# Patient Record
Sex: FEMALE | Race: WHITE | NOT HISPANIC OR LATINO | Employment: OTHER | URBAN - NONMETROPOLITAN AREA
[De-identification: names, ages, dates, MRNs, and addresses within clinical notes are randomized per-mention and may not be internally consistent; named-entity substitution may affect disease eponyms.]

---

## 2023-01-01 ENCOUNTER — APPOINTMENT (OUTPATIENT)
Dept: HEMATOLOGY/ONCOLOGY | Facility: CLINIC | Age: 73
End: 2023-01-01
Payer: MEDICAID

## 2023-01-01 ENCOUNTER — TELEPHONE (OUTPATIENT)
Dept: HEMATOLOGY/ONCOLOGY | Facility: HOSPITAL | Age: 73
End: 2023-01-01
Payer: MEDICAID

## 2023-01-01 ENCOUNTER — INFUSION (OUTPATIENT)
Dept: HEMATOLOGY/ONCOLOGY | Facility: CLINIC | Age: 73
End: 2023-01-01
Payer: MEDICAID

## 2023-01-01 ENCOUNTER — TELEPHONE (OUTPATIENT)
Dept: HEMATOLOGY/ONCOLOGY | Facility: CLINIC | Age: 73
End: 2023-01-01
Payer: MEDICAID

## 2023-01-01 ENCOUNTER — SOCIAL WORK (OUTPATIENT)
Dept: HEMATOLOGY/ONCOLOGY | Facility: CLINIC | Age: 73
End: 2023-01-01

## 2023-01-01 ENCOUNTER — OFFICE VISIT (OUTPATIENT)
Dept: HEMATOLOGY/ONCOLOGY | Facility: CLINIC | Age: 73
End: 2023-01-01
Payer: MEDICAID

## 2023-01-01 ENCOUNTER — DOCUMENTATION (OUTPATIENT)
Dept: HEMATOLOGY/ONCOLOGY | Facility: CLINIC | Age: 73
End: 2023-01-01
Payer: MEDICAID

## 2023-01-01 ENCOUNTER — TELEPHONE (OUTPATIENT)
Dept: HEMATOLOGY/ONCOLOGY | Facility: CLINIC | Age: 73
End: 2023-01-01

## 2023-01-01 ENCOUNTER — DOCUMENTATION (OUTPATIENT)
Dept: HEMATOLOGY/ONCOLOGY | Facility: CLINIC | Age: 73
End: 2023-01-01

## 2023-01-01 VITALS
TEMPERATURE: 97.7 F | WEIGHT: 148.59 LBS | RESPIRATION RATE: 14 BRPM | BODY MASS INDEX: 26.33 KG/M2 | HEIGHT: 63 IN | OXYGEN SATURATION: 100 % | HEART RATE: 86 BPM | DIASTOLIC BLOOD PRESSURE: 79 MMHG | SYSTOLIC BLOOD PRESSURE: 131 MMHG

## 2023-01-01 VITALS
WEIGHT: 145.83 LBS | TEMPERATURE: 97.3 F | SYSTOLIC BLOOD PRESSURE: 138 MMHG | OXYGEN SATURATION: 99 % | BODY MASS INDEX: 25.84 KG/M2 | HEART RATE: 80 BPM | RESPIRATION RATE: 16 BRPM | DIASTOLIC BLOOD PRESSURE: 78 MMHG

## 2023-01-01 VITALS
DIASTOLIC BLOOD PRESSURE: 82 MMHG | SYSTOLIC BLOOD PRESSURE: 155 MMHG | TEMPERATURE: 98.4 F | HEART RATE: 76 BPM | OXYGEN SATURATION: 99 % | BODY MASS INDEX: 26.45 KG/M2 | WEIGHT: 149.25 LBS | RESPIRATION RATE: 18 BRPM

## 2023-01-01 VITALS
OXYGEN SATURATION: 98 % | BODY MASS INDEX: 27.93 KG/M2 | HEART RATE: 74 BPM | RESPIRATION RATE: 16 BRPM | TEMPERATURE: 97.7 F | WEIGHT: 157.63 LBS | DIASTOLIC BLOOD PRESSURE: 70 MMHG | SYSTOLIC BLOOD PRESSURE: 117 MMHG

## 2023-01-01 DIAGNOSIS — C56.9 MALIGNANT NEOPLASM OF OVARY, UNSPECIFIED LATERALITY (MULTI): ICD-10-CM

## 2023-01-01 DIAGNOSIS — C56.9 MALIGNANT NEOPLASM OF OVARY, UNSPECIFIED LATERALITY (MULTI): Primary | ICD-10-CM

## 2023-01-01 DIAGNOSIS — C56.9 OVARIAN CANCER, UNSPECIFIED LATERALITY (MULTI): ICD-10-CM

## 2023-01-01 DIAGNOSIS — C56.3 MALIGNANT NEOPLASM OF BOTH OVARIES (MULTI): Primary | ICD-10-CM

## 2023-01-01 LAB
ALBUMIN SERPL BCP-MCNC: 2.9 G/DL (ref 3.4–5)
ALBUMIN SERPL BCP-MCNC: 3.1 G/DL (ref 3.4–5)
ALP SERPL-CCNC: 162 U/L (ref 33–136)
ALP SERPL-CCNC: 249 U/L (ref 33–136)
ALT SERPL W P-5'-P-CCNC: 11 U/L (ref 7–45)
ALT SERPL W P-5'-P-CCNC: 18 U/L (ref 7–45)
ANION GAP SERPL CALC-SCNC: 12 MMOL/L (ref 10–20)
ANION GAP SERPL CALC-SCNC: 13 MMOL/L (ref 10–20)
AST SERPL W P-5'-P-CCNC: 20 U/L (ref 9–39)
AST SERPL W P-5'-P-CCNC: 29 U/L (ref 9–39)
BASOPHILS # BLD AUTO: 0.03 X10*3/UL (ref 0–0.1)
BASOPHILS # BLD AUTO: 0.04 X10*3/UL (ref 0–0.1)
BASOPHILS # BLD AUTO: 0.07 X10*3/UL (ref 0–0.1)
BASOPHILS NFR BLD AUTO: 0.4 %
BASOPHILS NFR BLD AUTO: 0.5 %
BASOPHILS NFR BLD AUTO: 0.6 %
BILIRUB SERPL-MCNC: 0.3 MG/DL (ref 0–1.2)
BILIRUB SERPL-MCNC: 0.4 MG/DL (ref 0–1.2)
BUN SERPL-MCNC: 16 MG/DL (ref 6–23)
BUN SERPL-MCNC: 18 MG/DL (ref 6–23)
CALCIUM SERPL-MCNC: 9.2 MG/DL (ref 8.6–10.6)
CALCIUM SERPL-MCNC: 9.4 MG/DL (ref 8.6–10.6)
CANCER AG125 SERPL-ACNC: 956.4 U/ML (ref 0–30.2)
CEA SERPL-MCNC: 2.1 UG/L
CHLORIDE SERPL-SCNC: 100 MMOL/L (ref 98–107)
CHLORIDE SERPL-SCNC: 100 MMOL/L (ref 98–107)
CO2 SERPL-SCNC: 29 MMOL/L (ref 21–32)
CO2 SERPL-SCNC: 30 MMOL/L (ref 21–32)
CREAT SERPL-MCNC: 0.27 MG/DL (ref 0.5–1.05)
CREAT SERPL-MCNC: 0.36 MG/DL (ref 0.5–1.05)
EOSINOPHIL # BLD AUTO: 0.16 X10*3/UL (ref 0–0.4)
EOSINOPHIL # BLD AUTO: 0.18 X10*3/UL (ref 0–0.4)
EOSINOPHIL # BLD AUTO: 0.27 X10*3/UL (ref 0–0.4)
EOSINOPHIL NFR BLD AUTO: 1 %
EOSINOPHIL NFR BLD AUTO: 2.1 %
EOSINOPHIL NFR BLD AUTO: 5.2 %
ERYTHROCYTE [DISTWIDTH] IN BLOOD BY AUTOMATED COUNT: 19.1 % (ref 11.5–14.5)
ERYTHROCYTE [DISTWIDTH] IN BLOOD BY AUTOMATED COUNT: 19.1 % (ref 11.5–14.5)
ERYTHROCYTE [DISTWIDTH] IN BLOOD BY AUTOMATED COUNT: 19.3 % (ref 11.5–14.5)
GFR SERPL CREATININE-BSD FRML MDRD: >90 ML/MIN/1.73M*2
GFR SERPL CREATININE-BSD FRML MDRD: >90 ML/MIN/1.73M*2
GLUCOSE SERPL-MCNC: 80 MG/DL (ref 74–99)
GLUCOSE SERPL-MCNC: 89 MG/DL (ref 74–99)
HCT VFR BLD AUTO: 27.4 % (ref 36–46)
HCT VFR BLD AUTO: 29.4 % (ref 36–46)
HCT VFR BLD AUTO: 30.8 % (ref 36–46)
HGB BLD-MCNC: 8.3 G/DL (ref 12–16)
HGB BLD-MCNC: 8.8 G/DL (ref 12–16)
HGB BLD-MCNC: 9.2 G/DL (ref 12–16)
IMM GRANULOCYTES # BLD AUTO: 0 X10*3/UL (ref 0–0.5)
IMM GRANULOCYTES # BLD AUTO: 0.02 X10*3/UL (ref 0–0.5)
IMM GRANULOCYTES # BLD AUTO: 0.3 X10*3/UL (ref 0–0.5)
IMM GRANULOCYTES NFR BLD AUTO: 0 % (ref 0–0.9)
IMM GRANULOCYTES NFR BLD AUTO: 0.3 % (ref 0–0.9)
IMM GRANULOCYTES NFR BLD AUTO: 1.6 % (ref 0–0.9)
LYMPHOCYTES # BLD AUTO: 0.94 X10*3/UL (ref 0.8–3)
LYMPHOCYTES # BLD AUTO: 0.99 X10*3/UL (ref 0.8–3)
LYMPHOCYTES # BLD AUTO: 1.45 X10*3/UL (ref 0.8–3)
LYMPHOCYTES NFR BLD AUTO: 12.7 %
LYMPHOCYTES NFR BLD AUTO: 18.1 %
LYMPHOCYTES NFR BLD AUTO: 7.9 %
MCH RBC QN AUTO: 29.5 PG (ref 26–34)
MCHC RBC AUTO-ENTMCNC: 29.9 G/DL (ref 32–36)
MCHC RBC AUTO-ENTMCNC: 29.9 G/DL (ref 32–36)
MCHC RBC AUTO-ENTMCNC: 30.3 G/DL (ref 32–36)
MCV RBC AUTO: 98 FL (ref 80–100)
MCV RBC AUTO: 99 FL (ref 80–100)
MCV RBC AUTO: 99 FL (ref 80–100)
MONOCYTES # BLD AUTO: 0.51 X10*3/UL (ref 0.05–0.8)
MONOCYTES # BLD AUTO: 1.06 X10*3/UL (ref 0.05–0.8)
MONOCYTES # BLD AUTO: 1.7 X10*3/UL (ref 0.05–0.8)
MONOCYTES NFR BLD AUTO: 13.6 %
MONOCYTES NFR BLD AUTO: 9.3 %
MONOCYTES NFR BLD AUTO: 9.8 %
NEUTROPHILS # BLD AUTO: 14.56 X10*3/UL (ref 1.6–5.5)
NEUTROPHILS # BLD AUTO: 3.44 X10*3/UL (ref 1.6–5.5)
NEUTROPHILS # BLD AUTO: 5.52 X10*3/UL (ref 1.6–5.5)
NEUTROPHILS NFR BLD AUTO: 66.3 %
NEUTROPHILS NFR BLD AUTO: 70.8 %
NEUTROPHILS NFR BLD AUTO: 79.8 %
NRBC BLD-RTO: ABNORMAL /100{WBCS}
PLATELET # BLD AUTO: 115 X10*3/UL (ref 150–450)
PLATELET # BLD AUTO: 282 X10*3/UL (ref 150–450)
PLATELET # BLD AUTO: 457 X10*3/UL (ref 150–450)
PMV BLD AUTO: 8.5 FL (ref 7.5–11.5)
PMV BLD AUTO: 9.1 FL (ref 7.5–11.5)
POTASSIUM SERPL-SCNC: 4.4 MMOL/L (ref 3.5–5.3)
POTASSIUM SERPL-SCNC: 4.6 MMOL/L (ref 3.5–5.3)
PROT SERPL-MCNC: 6 G/DL (ref 6.4–8.2)
PROT SERPL-MCNC: 6.1 G/DL (ref 6.4–8.2)
RBC # BLD AUTO: 2.81 X10*6/UL (ref 4–5.2)
RBC # BLD AUTO: 2.98 X10*6/UL (ref 4–5.2)
RBC # BLD AUTO: 3.12 X10*6/UL (ref 4–5.2)
SODIUM SERPL-SCNC: 137 MMOL/L (ref 136–145)
SODIUM SERPL-SCNC: 138 MMOL/L (ref 136–145)
WBC # BLD AUTO: 18.3 X10*3/UL (ref 4.4–11.3)
WBC # BLD AUTO: 5.2 X10*3/UL (ref 4.4–11.3)
WBC # BLD AUTO: 7.8 X10*3/UL (ref 4.4–11.3)

## 2023-01-01 PROCEDURE — 96375 TX/PRO/DX INJ NEW DRUG ADDON: CPT | Mod: INF

## 2023-01-01 PROCEDURE — 96375 TX/PRO/DX INJ NEW DRUG ADDON: CPT

## 2023-01-01 PROCEDURE — 2500000004 HC RX 250 GENERAL PHARMACY W/ HCPCS (ALT 636 FOR OP/ED): Mod: SE | Performed by: INTERNAL MEDICINE

## 2023-01-01 PROCEDURE — 1159F MED LIST DOCD IN RCRD: CPT | Performed by: INTERNAL MEDICINE

## 2023-01-01 PROCEDURE — 96417 CHEMO IV INFUS EACH ADDL SEQ: CPT

## 2023-01-01 PROCEDURE — 1125F AMNT PAIN NOTED PAIN PRSNT: CPT | Performed by: INTERNAL MEDICINE

## 2023-01-01 PROCEDURE — 85025 COMPLETE CBC W/AUTO DIFF WBC: CPT

## 2023-01-01 PROCEDURE — 96413 CHEMO IV INFUSION 1 HR: CPT

## 2023-01-01 PROCEDURE — 2500000004 HC RX 250 GENERAL PHARMACY W/ HCPCS (ALT 636 FOR OP/ED): Mod: SE

## 2023-01-01 PROCEDURE — 84075 ASSAY ALKALINE PHOSPHATASE: CPT | Mod: CMCLAB

## 2023-01-01 PROCEDURE — 99215 OFFICE O/P EST HI 40 MIN: CPT | Performed by: INTERNAL MEDICINE

## 2023-01-01 PROCEDURE — 80053 COMPREHEN METABOLIC PANEL: CPT | Mod: CMCLAB

## 2023-01-01 PROCEDURE — 82378 CARCINOEMBRYONIC ANTIGEN: CPT | Mod: CMCLAB

## 2023-01-01 PROCEDURE — 86304 IMMUNOASSAY TUMOR CA 125: CPT

## 2023-01-01 PROCEDURE — 80053 COMPREHEN METABOLIC PANEL: CPT

## 2023-01-01 PROCEDURE — 3078F DIAST BP <80 MM HG: CPT | Performed by: INTERNAL MEDICINE

## 2023-01-01 PROCEDURE — 3074F SYST BP LT 130 MM HG: CPT | Performed by: INTERNAL MEDICINE

## 2023-01-01 PROCEDURE — 1036F TOBACCO NON-USER: CPT | Performed by: INTERNAL MEDICINE

## 2023-01-01 PROCEDURE — 99215 OFFICE O/P EST HI 40 MIN: CPT | Mod: 25,PO | Performed by: INTERNAL MEDICINE

## 2023-01-01 PROCEDURE — 36415 COLL VENOUS BLD VENIPUNCTURE: CPT

## 2023-01-01 PROCEDURE — 2500000004 HC RX 250 GENERAL PHARMACY W/ HCPCS (ALT 636 FOR OP/ED): Mod: JZ,SE | Performed by: INTERNAL MEDICINE

## 2023-01-01 PROCEDURE — 1126F AMNT PAIN NOTED NONE PRSNT: CPT | Performed by: INTERNAL MEDICINE

## 2023-01-01 PROCEDURE — 96377 APPLICATON ON-BODY INJECTOR: CPT

## 2023-01-01 PROCEDURE — 1158F ADVNC CARE PLAN TLK DOCD: CPT | Performed by: INTERNAL MEDICINE

## 2023-01-01 PROCEDURE — 96372 THER/PROPH/DIAG INJ SC/IM: CPT

## 2023-01-01 RX ORDER — PALONOSETRON 0.05 MG/ML
250 INJECTION, SOLUTION INTRAVENOUS ONCE
Status: CANCELLED
Start: 2023-01-01 | End: 2023-01-01

## 2023-01-01 RX ORDER — PROCHLORPERAZINE EDISYLATE 5 MG/ML
10 INJECTION INTRAMUSCULAR; INTRAVENOUS EVERY 6 HOURS PRN
Status: CANCELLED | OUTPATIENT
Start: 2023-01-01

## 2023-01-01 RX ORDER — ENOXAPARIN SODIUM 100 MG/ML
INJECTION SUBCUTANEOUS
COMMUNITY
Start: 2023-01-01

## 2023-01-01 RX ORDER — DEXAMETHASONE SODIUM PHOSPHATE 100 MG/10ML
10 INJECTION INTRAMUSCULAR; INTRAVENOUS ONCE
Status: CANCELLED
Start: 2023-01-01 | End: 2023-01-01

## 2023-01-01 RX ORDER — OXYCODONE HYDROCHLORIDE 5 MG/1
TABLET ORAL
COMMUNITY
Start: 2023-01-01

## 2023-01-01 RX ORDER — DIPHENHYDRAMINE HYDROCHLORIDE 50 MG/ML
50 INJECTION INTRAMUSCULAR; INTRAVENOUS AS NEEDED
Status: CANCELLED | OUTPATIENT
Start: 2023-01-01

## 2023-01-01 RX ORDER — GLUCOSAMINE/CHONDR SU A SOD 750-600 MG
TABLET ORAL
COMMUNITY
Start: 2022-08-05

## 2023-01-01 RX ORDER — BEVACIZUMAB 100 MG/4ML
INJECTION, SOLUTION INTRAVENOUS
COMMUNITY

## 2023-01-01 RX ORDER — CALCIUM CITRATE/VITAMIN D3 200MG-6.25
TABLET ORAL
COMMUNITY
Start: 2023-01-01

## 2023-01-01 RX ORDER — OMEPRAZOLE 20 MG/1
1 CAPSULE, DELAYED RELEASE ORAL DAILY
COMMUNITY
Start: 2020-01-22 | End: 2023-01-01 | Stop reason: ALTCHOICE

## 2023-01-01 RX ORDER — PROCHLORPERAZINE EDISYLATE 5 MG/ML
10 INJECTION INTRAMUSCULAR; INTRAVENOUS EVERY 6 HOURS PRN
Status: DISCONTINUED | OUTPATIENT
Start: 2023-01-01 | End: 2023-01-01 | Stop reason: HOSPADM

## 2023-01-01 RX ORDER — EPINEPHRINE 0.3 MG/.3ML
0.3 INJECTION SUBCUTANEOUS EVERY 5 MIN PRN
Status: CANCELLED | OUTPATIENT
Start: 2023-01-01

## 2023-01-01 RX ORDER — HEPARIN SODIUM,PORCINE/PF 10 UNIT/ML
50 SYRINGE (ML) INTRAVENOUS AS NEEDED
Status: CANCELLED | OUTPATIENT
Start: 2023-01-01

## 2023-01-01 RX ORDER — ALBUTEROL SULFATE 0.83 MG/ML
3 SOLUTION RESPIRATORY (INHALATION) AS NEEDED
Status: CANCELLED | OUTPATIENT
Start: 2023-01-01

## 2023-01-01 RX ORDER — FAMOTIDINE 10 MG/ML
20 INJECTION INTRAVENOUS ONCE AS NEEDED
Status: CANCELLED | OUTPATIENT
Start: 2023-01-01

## 2023-01-01 RX ORDER — LEVOFLOXACIN 500 MG/1
TABLET, FILM COATED ORAL
COMMUNITY
Start: 2023-01-01

## 2023-01-01 RX ORDER — IPRATROPIUM BROMIDE AND ALBUTEROL SULFATE 2.5; .5 MG/3ML; MG/3ML
SOLUTION RESPIRATORY (INHALATION)
COMMUNITY
Start: 2023-01-01

## 2023-01-01 RX ORDER — PAROXETINE HYDROCHLORIDE 20 MG/1
20 TABLET, FILM COATED ORAL DAILY
COMMUNITY
Start: 2020-01-22

## 2023-01-01 RX ORDER — DOCUSATE SODIUM 100 MG/1
100 CAPSULE, LIQUID FILLED ORAL 2 TIMES DAILY
COMMUNITY

## 2023-01-01 RX ORDER — HEPARIN 100 UNIT/ML
SYRINGE INTRAVENOUS
Status: COMPLETED
Start: 2023-01-01 | End: 2023-01-01

## 2023-01-01 RX ORDER — DEXAMETHASONE SODIUM PHOSPHATE 4 MG/ML
10 INJECTION, SOLUTION INTRA-ARTICULAR; INTRALESIONAL; INTRAMUSCULAR; INTRAVENOUS; SOFT TISSUE ONCE
Status: COMPLETED | OUTPATIENT
Start: 2023-01-01 | End: 2023-01-01

## 2023-01-01 RX ORDER — HEPARIN 100 UNIT/ML
500 SYRINGE INTRAVENOUS AS NEEDED
Status: DISCONTINUED | OUTPATIENT
Start: 2023-01-01 | End: 2023-01-01 | Stop reason: HOSPADM

## 2023-01-01 RX ORDER — POTASSIUM CHLORIDE 750 MG/1
1 TABLET, FILM COATED, EXTENDED RELEASE ORAL EVERY OTHER DAY
COMMUNITY
Start: 2020-01-22

## 2023-01-01 RX ORDER — LEVOFLOXACIN 250 MG/1
TABLET ORAL
COMMUNITY
Start: 2023-01-01

## 2023-01-01 RX ORDER — FLUTICASONE PROPIONATE 110 UG/1
2 AEROSOL, METERED RESPIRATORY (INHALATION) 2 TIMES DAILY
COMMUNITY

## 2023-01-01 RX ORDER — PANTOPRAZOLE SODIUM 40 MG/1
TABLET, DELAYED RELEASE ORAL
COMMUNITY
Start: 2023-01-01

## 2023-01-01 RX ORDER — PROCHLORPERAZINE MALEATE 10 MG
10 TABLET ORAL EVERY 6 HOURS PRN
Status: CANCELLED | OUTPATIENT
Start: 2023-01-01

## 2023-01-01 RX ORDER — HYDROCHLOROTHIAZIDE 25 MG/1
1 TABLET ORAL DAILY
COMMUNITY

## 2023-01-01 RX ORDER — PEGFILGRASTIM 6 MG/.6ML
INJECTION SUBCUTANEOUS
COMMUNITY

## 2023-01-01 RX ORDER — PSEUDOEPHEDRINE HCL 30 MG
TABLET ORAL
COMMUNITY
Start: 2023-01-24 | End: 2023-01-01 | Stop reason: ALTCHOICE

## 2023-01-01 RX ORDER — MULTIVITAMIN
1 TABLET ORAL DAILY
COMMUNITY
Start: 2023-01-10

## 2023-01-01 RX ORDER — GLUCOSAMINE/CHONDRO SU A 500-400 MG
TABLET ORAL
COMMUNITY

## 2023-01-01 RX ORDER — DENOSUMAB 120 MG/1.7ML
INJECTION SUBCUTANEOUS
COMMUNITY

## 2023-01-01 RX ORDER — AZELASTINE 1 MG/ML
2 SPRAY, METERED NASAL 2 TIMES DAILY
COMMUNITY

## 2023-01-01 RX ORDER — PALONOSETRON 0.05 MG/ML
250 INJECTION, SOLUTION INTRAVENOUS ONCE
Status: COMPLETED | OUTPATIENT
Start: 2023-01-01 | End: 2023-01-01

## 2023-01-01 RX ORDER — PEGFILGRASTIM 6 MG/0.6ML
KIT SUBCUTANEOUS
COMMUNITY

## 2023-01-01 RX ORDER — ONDANSETRON HYDROCHLORIDE 8 MG/1
8 TABLET, FILM COATED ORAL EVERY 8 HOURS PRN
COMMUNITY
Start: 2021-11-04

## 2023-01-01 RX ORDER — DEXAMETHASONE SODIUM PHOSPHATE 100 MG/10ML
10 INJECTION INTRAMUSCULAR; INTRAVENOUS ONCE
Status: COMPLETED | OUTPATIENT
Start: 2023-01-01 | End: 2023-01-01

## 2023-01-01 RX ORDER — PROCHLORPERAZINE MALEATE 10 MG
10 TABLET ORAL EVERY 6 HOURS PRN
Status: DISCONTINUED | OUTPATIENT
Start: 2023-01-01 | End: 2023-01-01 | Stop reason: HOSPADM

## 2023-01-01 RX ORDER — DIPHENHYDRAMINE HYDROCHLORIDE 50 MG/ML
50 INJECTION INTRAMUSCULAR; INTRAVENOUS AS NEEDED
Status: DISCONTINUED | OUTPATIENT
Start: 2023-01-01 | End: 2023-01-01 | Stop reason: HOSPADM

## 2023-01-01 RX ORDER — AMLODIPINE BESYLATE 10 MG/1
1 TABLET ORAL DAILY
COMMUNITY

## 2023-01-01 RX ORDER — MULTIVITAMIN WITH FOLIC ACID 400 MCG
TABLET ORAL
COMMUNITY
Start: 2023-01-01 | End: 2023-01-01 | Stop reason: ALTCHOICE

## 2023-01-01 RX ORDER — BENZONATATE 100 MG/1
CAPSULE ORAL
COMMUNITY
Start: 2023-01-01

## 2023-01-01 RX ORDER — HEPARIN 100 UNIT/ML
500 SYRINGE INTRAVENOUS AS NEEDED
Status: CANCELLED | OUTPATIENT
Start: 2023-01-01

## 2023-01-01 RX ORDER — FUROSEMIDE 20 MG/1
1 TABLET ORAL DAILY
COMMUNITY
Start: 2020-01-22 | End: 2023-01-01 | Stop reason: ALTCHOICE

## 2023-01-01 RX ORDER — HYDROXYCHLOROQUINE SULFATE 200 MG/1
1 TABLET, FILM COATED ORAL DAILY
COMMUNITY
Start: 2020-01-22

## 2023-01-01 RX ORDER — PROCHLORPERAZINE MALEATE 10 MG
10 TABLET ORAL EVERY 6 HOURS PRN
Qty: 120 TABLET | Refills: 0 | Status: SHIPPED | OUTPATIENT
Start: 2023-01-01 | End: 2023-01-01

## 2023-01-01 RX ORDER — ALBUTEROL SULFATE 90 UG/1
2 AEROSOL, METERED RESPIRATORY (INHALATION) EVERY 6 HOURS PRN
COMMUNITY

## 2023-01-01 RX ORDER — ONDANSETRON 4 MG/1
1 TABLET, FILM COATED ORAL EVERY 6 HOURS PRN
COMMUNITY
Start: 2020-01-22

## 2023-01-01 RX ORDER — FAMOTIDINE 10 MG/ML
20 INJECTION INTRAVENOUS ONCE AS NEEDED
Status: DISCONTINUED | OUTPATIENT
Start: 2023-01-01 | End: 2023-01-01 | Stop reason: HOSPADM

## 2023-01-01 RX ORDER — ADHESIVE BANDAGE
30 BANDAGE TOPICAL
COMMUNITY

## 2023-01-01 RX ORDER — ALBUTEROL SULFATE 0.83 MG/ML
3 SOLUTION RESPIRATORY (INHALATION) AS NEEDED
Status: DISCONTINUED | OUTPATIENT
Start: 2023-01-01 | End: 2023-01-01 | Stop reason: HOSPADM

## 2023-01-01 RX ORDER — PREDNISONE 10 MG/1
TABLET ORAL
COMMUNITY
Start: 2023-01-01

## 2023-01-01 RX ORDER — OMEPRAZOLE 40 MG/1
40 CAPSULE, DELAYED RELEASE ORAL 2 TIMES DAILY
COMMUNITY
End: 2023-01-01 | Stop reason: ALTCHOICE

## 2023-01-01 RX ORDER — CETIRIZINE HYDROCHLORIDE 10 MG/1
10 TABLET ORAL EVERY EVENING
COMMUNITY
End: 2023-01-01 | Stop reason: ALTCHOICE

## 2023-01-01 RX ORDER — ACETAMINOPHEN 160 MG/5ML
SUSPENSION ORAL
COMMUNITY
Start: 2020-01-22

## 2023-01-01 RX ORDER — BUTALB/ACETAMINOPHEN/CAFFEINE 50-325-40
TABLET ORAL
COMMUNITY
Start: 2023-01-01 | End: 2023-01-01 | Stop reason: SDUPTHER

## 2023-01-01 RX ORDER — PREDNISONE 20 MG/1
TABLET ORAL
COMMUNITY
Start: 2023-01-01

## 2023-01-01 RX ORDER — NITROFURANTOIN 25; 75 MG/1; MG/1
CAPSULE ORAL
COMMUNITY
Start: 2023-01-01

## 2023-01-01 RX ORDER — LORATADINE 10 MG/1
TABLET ORAL
COMMUNITY
Start: 2023-01-01

## 2023-01-01 RX ORDER — AMOXICILLIN AND CLAVULANATE POTASSIUM 875; 125 MG/1; MG/1
TABLET, FILM COATED ORAL
COMMUNITY
Start: 2023-01-01 | End: 2023-01-01 | Stop reason: ALTCHOICE

## 2023-01-01 RX ORDER — ACETAMINOPHEN 500 MG
TABLET ORAL
COMMUNITY
End: 2023-01-01 | Stop reason: SDUPTHER

## 2023-01-01 RX ORDER — DEXTROMETHORPHAN HYDROBROMIDE, GUAIFENESIN 5; 100 MG/5ML; MG/5ML
650 LIQUID ORAL EVERY 8 HOURS PRN
COMMUNITY

## 2023-01-01 RX ORDER — EPINEPHRINE 0.3 MG/.3ML
0.3 INJECTION SUBCUTANEOUS EVERY 5 MIN PRN
Status: DISCONTINUED | OUTPATIENT
Start: 2023-01-01 | End: 2023-01-01 | Stop reason: HOSPADM

## 2023-01-01 RX ORDER — SYRINGE-NEEDLE,INSULIN,0.5 ML 28GX1/2"
SYRINGE, EMPTY DISPOSABLE MISCELLANEOUS
COMMUNITY
Start: 2023-01-01

## 2023-01-01 RX ORDER — PROCHLORPERAZINE MALEATE 10 MG
TABLET ORAL
COMMUNITY
Start: 2023-01-01

## 2023-01-01 RX ORDER — FAMOTIDINE 10 MG/ML
INJECTION INTRAVENOUS
COMMUNITY

## 2023-01-01 RX ORDER — LOSARTAN POTASSIUM 25 MG/1
1 TABLET ORAL DAILY
COMMUNITY
End: 2023-01-01 | Stop reason: ALTCHOICE

## 2023-01-01 RX ORDER — POLYETHYLENE GLYCOL 3350 17 G/17G
17 POWDER, FOR SOLUTION ORAL
COMMUNITY
Start: 2023-01-01

## 2023-01-01 RX ORDER — NYSTATIN 100000 [USP'U]/G
POWDER TOPICAL
COMMUNITY
Start: 2023-03-26

## 2023-01-01 RX ORDER — CHOLECALCIFEROL (VITAMIN D3) 25 MCG
TABLET ORAL
COMMUNITY
Start: 2020-01-22 | End: 2023-01-01 | Stop reason: SDUPTHER

## 2023-01-01 RX ORDER — AMLODIPINE BESYLATE 5 MG/1
1 TABLET ORAL DAILY
COMMUNITY
Start: 2020-01-22

## 2023-01-01 RX ORDER — PREDNISONE 10 MG/1
TABLET ORAL
COMMUNITY
End: 2023-01-01 | Stop reason: SDUPTHER

## 2023-01-01 RX ORDER — OLOPATADINE HYDROCHLORIDE 1 MG/ML
1 SOLUTION/ DROPS OPHTHALMIC 2 TIMES DAILY PRN
COMMUNITY

## 2023-01-01 RX ORDER — MONTELUKAST SODIUM 10 MG/1
TABLET ORAL
COMMUNITY
Start: 2023-01-01

## 2023-01-01 RX ORDER — LIDOCAINE AND PRILOCAINE 25; 25 MG/G; MG/G
CREAM TOPICAL
COMMUNITY

## 2023-01-01 RX ORDER — MICONAZOLE NITRATE 2 %
1 POWDER (GRAM) TOPICAL 2 TIMES DAILY
COMMUNITY
Start: 2023-01-01

## 2023-01-01 RX ORDER — RIVAROXABAN 10 MG/1
10 TABLET, FILM COATED ORAL DAILY
COMMUNITY
Start: 2020-01-22

## 2023-01-01 RX ORDER — UBIQUINOL 100 MG
1 CAPSULE ORAL DAILY
COMMUNITY
Start: 2020-01-22

## 2023-01-01 RX ADMIN — GEMCITABINE 1239 MG: 38 INJECTION, SOLUTION INTRAVENOUS at 11:46

## 2023-01-01 RX ADMIN — PALONOSETRON 250 MCG: 0.05 INJECTION, SOLUTION INTRAVENOUS at 14:14

## 2023-01-01 RX ADMIN — PALONOSETRON 250 MCG: 0.05 INJECTION, SOLUTION INTRAVENOUS at 10:00

## 2023-01-01 RX ADMIN — PACLITAXEL 165 MG: 100 INJECTION, POWDER, LYOPHILIZED, FOR SUSPENSION INTRAVENOUS at 14:45

## 2023-01-01 RX ADMIN — DEXAMETHASONE SODIUM PHOSPHATE 10 MG: 10 INJECTION INTRAMUSCULAR; INTRAVENOUS at 11:10

## 2023-01-01 RX ADMIN — PEGFILGRASTIM 6 MG: KIT SUBCUTANEOUS at 16:06

## 2023-01-01 RX ADMIN — HEPARIN 500 UNITS: 100 SYRINGE at 13:34

## 2023-01-01 RX ADMIN — PACLITAXEL 165 MG: 100 INJECTION, POWDER, LYOPHILIZED, FOR SUSPENSION INTRAVENOUS at 11:05

## 2023-01-01 RX ADMIN — DEXAMETHASONE SODIUM PHOSPHATE 10 MG: 4 INJECTION, SOLUTION INTRA-ARTICULAR; INTRALESIONAL; INTRAMUSCULAR; INTRAVENOUS; SOFT TISSUE at 10:00

## 2023-01-01 RX ADMIN — DEXAMETHASONE SODIUM PHOSPHATE 10 MG: 10 INJECTION INTRAMUSCULAR; INTRAVENOUS at 14:00

## 2023-01-01 RX ADMIN — HEPARIN 500 UNITS: 100 SYRINGE at 12:40

## 2023-01-01 RX ADMIN — GEMCITABINE 1238.8 MG: 38 INJECTION, SOLUTION INTRAVENOUS at 15:32

## 2023-01-01 RX ADMIN — GEMCITABINE 1254.75 MG: 38 INJECTION, SOLUTION INTRAVENOUS at 12:49

## 2023-01-01 RX ADMIN — PALONOSETRON 250 MCG: 0.05 INJECTION, SOLUTION INTRAVENOUS at 11:09

## 2023-01-01 RX ADMIN — PACLITAXEL 165 MG: 100 INJECTION, POWDER, LYOPHILIZED, FOR SUSPENSION INTRAVENOUS at 11:57

## 2023-01-01 ASSESSMENT — PATIENT HEALTH QUESTIONNAIRE - PHQ9
2. FEELING DOWN, DEPRESSED OR HOPELESS: NOT AT ALL
1. LITTLE INTEREST OR PLEASURE IN DOING THINGS: NOT AT ALL
SUM OF ALL RESPONSES TO PHQ9 QUESTIONS 1 & 2: 0

## 2023-01-01 ASSESSMENT — PAIN SCALES - GENERAL
PAINLEVEL: 0-NO PAIN
PAINLEVEL: 0-NO PAIN
PAINLEVEL: 2
PAINLEVEL: 0-NO PAIN

## 2023-01-01 ASSESSMENT — ENCOUNTER SYMPTOMS: OCCASIONAL FEELINGS OF UNSTEADINESS: 1

## 2023-03-28 ENCOUNTER — INPATIENT HOSPITAL (OUTPATIENT)
Dept: URBAN - METROPOLITAN AREA HOSPITAL 50 | Facility: HOSPITAL | Age: 73
End: 2023-03-28
Payer: MEDICAID

## 2023-03-28 DIAGNOSIS — D50.9 IRON DEFICIENCY ANEMIA, UNSPECIFIED: ICD-10-CM

## 2023-03-28 DIAGNOSIS — R19.5 OTHER FECAL ABNORMALITIES: ICD-10-CM

## 2023-03-28 DIAGNOSIS — R13.10 DYSPHAGIA, UNSPECIFIED: ICD-10-CM

## 2023-03-28 DIAGNOSIS — R74.8 ABNORMAL LEVELS OF OTHER SERUM ENZYMES: ICD-10-CM

## 2023-03-28 DIAGNOSIS — D72.829 ELEVATED WHITE BLOOD CELL COUNT, UNSPECIFIED: ICD-10-CM

## 2023-03-28 DIAGNOSIS — K43.9 VENTRAL HERNIA WITHOUT OBSTRUCTION OR GANGRENE: ICD-10-CM

## 2023-03-28 PROCEDURE — 99254 IP/OBS CNSLTJ NEW/EST MOD 60: CPT | Performed by: NURSE PRACTITIONER

## 2023-03-29 ENCOUNTER — INPATIENT HOSPITAL (OUTPATIENT)
Dept: URBAN - METROPOLITAN AREA HOSPITAL 50 | Facility: HOSPITAL | Age: 73
End: 2023-03-29
Payer: MEDICAID

## 2023-03-29 DIAGNOSIS — K21.9 GASTRO-ESOPHAGEAL REFLUX DISEASE WITHOUT ESOPHAGITIS: ICD-10-CM

## 2023-03-29 DIAGNOSIS — R74.8 ABNORMAL LEVELS OF OTHER SERUM ENZYMES: ICD-10-CM

## 2023-03-29 DIAGNOSIS — K44.9 DIAPHRAGMATIC HERNIA WITHOUT OBSTRUCTION OR GANGRENE: ICD-10-CM

## 2023-03-29 DIAGNOSIS — D72.829 ELEVATED WHITE BLOOD CELL COUNT, UNSPECIFIED: ICD-10-CM

## 2023-03-29 DIAGNOSIS — D50.9 IRON DEFICIENCY ANEMIA, UNSPECIFIED: ICD-10-CM

## 2023-03-29 DIAGNOSIS — R19.7 DIARRHEA, UNSPECIFIED: ICD-10-CM

## 2023-03-29 DIAGNOSIS — K43.9 VENTRAL HERNIA WITHOUT OBSTRUCTION OR GANGRENE: ICD-10-CM

## 2023-03-29 PROCEDURE — 99233 SBSQ HOSP IP/OBS HIGH 50: CPT

## 2023-03-30 PROCEDURE — 99233 SBSQ HOSP IP/OBS HIGH 50: CPT

## 2023-03-31 ENCOUNTER — INPATIENT HOSPITAL (OUTPATIENT)
Dept: URBAN - METROPOLITAN AREA HOSPITAL 50 | Facility: HOSPITAL | Age: 73
End: 2023-03-31
Payer: MEDICAID

## 2023-03-31 DIAGNOSIS — R19.5 OTHER FECAL ABNORMALITIES: ICD-10-CM

## 2023-03-31 DIAGNOSIS — D50.9 IRON DEFICIENCY ANEMIA, UNSPECIFIED: ICD-10-CM

## 2023-03-31 DIAGNOSIS — K21.9 GASTRO-ESOPHAGEAL REFLUX DISEASE WITHOUT ESOPHAGITIS: ICD-10-CM

## 2023-03-31 DIAGNOSIS — D72.829 ELEVATED WHITE BLOOD CELL COUNT, UNSPECIFIED: ICD-10-CM

## 2023-03-31 DIAGNOSIS — K43.9 VENTRAL HERNIA WITHOUT OBSTRUCTION OR GANGRENE: ICD-10-CM

## 2023-03-31 DIAGNOSIS — R13.10 DYSPHAGIA, UNSPECIFIED: ICD-10-CM

## 2023-03-31 PROCEDURE — 99232 SBSQ HOSP IP/OBS MODERATE 35: CPT

## 2023-04-02 ENCOUNTER — INPATIENT HOSPITAL (OUTPATIENT)
Dept: URBAN - METROPOLITAN AREA HOSPITAL 50 | Facility: HOSPITAL | Age: 73
End: 2023-04-02
Payer: MEDICAID

## 2023-04-02 DIAGNOSIS — R19.5 OTHER FECAL ABNORMALITIES: ICD-10-CM

## 2023-04-02 DIAGNOSIS — D50.0 IRON DEFICIENCY ANEMIA SECONDARY TO BLOOD LOSS (CHRONIC): ICD-10-CM

## 2023-04-02 DIAGNOSIS — D72.828 OTHER ELEVATED WHITE BLOOD CELL COUNT: ICD-10-CM

## 2023-04-02 DIAGNOSIS — K43.9 VENTRAL HERNIA WITHOUT OBSTRUCTION OR GANGRENE: ICD-10-CM

## 2023-04-02 DIAGNOSIS — R13.12 DYSPHAGIA, OROPHARYNGEAL PHASE: ICD-10-CM

## 2023-04-02 PROCEDURE — 99233 SBSQ HOSP IP/OBS HIGH 50: CPT

## 2023-04-03 ENCOUNTER — INPATIENT HOSPITAL (OUTPATIENT)
Dept: URBAN - METROPOLITAN AREA HOSPITAL 50 | Facility: HOSPITAL | Age: 73
End: 2023-04-03
Payer: MEDICAID

## 2023-04-03 DIAGNOSIS — K22.2 ESOPHAGEAL OBSTRUCTION: ICD-10-CM

## 2023-04-03 DIAGNOSIS — K29.60 OTHER GASTRITIS WITHOUT BLEEDING: ICD-10-CM

## 2023-04-03 DIAGNOSIS — K44.9 DIAPHRAGMATIC HERNIA WITHOUT OBSTRUCTION OR GANGRENE: ICD-10-CM

## 2023-04-03 PROCEDURE — 43239 EGD BIOPSY SINGLE/MULTIPLE: CPT | Performed by: INTERNAL MEDICINE

## 2023-04-03 PROCEDURE — 43450 DILATE ESOPHAGUS 1/MULT PASS: CPT | Performed by: INTERNAL MEDICINE

## 2023-04-04 ENCOUNTER — INPATIENT HOSPITAL (OUTPATIENT)
Dept: URBAN - METROPOLITAN AREA HOSPITAL 50 | Facility: HOSPITAL | Age: 73
End: 2023-04-04
Payer: MEDICAID

## 2023-04-04 DIAGNOSIS — D50.0 IRON DEFICIENCY ANEMIA SECONDARY TO BLOOD LOSS (CHRONIC): ICD-10-CM

## 2023-04-04 DIAGNOSIS — K44.9 DIAPHRAGMATIC HERNIA WITHOUT OBSTRUCTION OR GANGRENE: ICD-10-CM

## 2023-04-04 DIAGNOSIS — K29.60 OTHER GASTRITIS WITHOUT BLEEDING: ICD-10-CM

## 2023-04-04 DIAGNOSIS — K22.2 ESOPHAGEAL OBSTRUCTION: ICD-10-CM

## 2023-04-04 PROCEDURE — 99232 SBSQ HOSP IP/OBS MODERATE 35: CPT | Mod: SA | Performed by: CLINICAL NURSE SPECIALIST

## 2023-04-14 ENCOUNTER — INPATIENT HOSPITAL (OUTPATIENT)
Dept: URBAN - METROPOLITAN AREA HOSPITAL 50 | Facility: HOSPITAL | Age: 73
End: 2023-04-14
Payer: MEDICAID

## 2023-04-14 DIAGNOSIS — D50.0 IRON DEFICIENCY ANEMIA SECONDARY TO BLOOD LOSS (CHRONIC): ICD-10-CM

## 2023-04-14 DIAGNOSIS — R10.84 GENERALIZED ABDOMINAL PAIN: ICD-10-CM

## 2023-04-14 DIAGNOSIS — K76.9 LIVER DISEASE, UNSPECIFIED: ICD-10-CM

## 2023-04-14 DIAGNOSIS — R93.3 ABNORMAL FINDINGS ON DIAGNOSTIC IMAGING OF OTHER PARTS OF DI: ICD-10-CM

## 2023-04-14 DIAGNOSIS — R11.2 NAUSEA WITH VOMITING, UNSPECIFIED: ICD-10-CM

## 2023-04-14 PROCEDURE — 99254 IP/OBS CNSLTJ NEW/EST MOD 60: CPT | Performed by: INTERNAL MEDICINE

## 2023-04-15 ENCOUNTER — INPATIENT HOSPITAL (OUTPATIENT)
Dept: URBAN - METROPOLITAN AREA HOSPITAL 50 | Facility: HOSPITAL | Age: 73
End: 2023-04-15
Payer: MEDICAID

## 2023-04-15 DIAGNOSIS — R93.3 ABNORMAL FINDINGS ON DIAGNOSTIC IMAGING OF OTHER PARTS OF DI: ICD-10-CM

## 2023-04-15 DIAGNOSIS — D50.0 IRON DEFICIENCY ANEMIA SECONDARY TO BLOOD LOSS (CHRONIC): ICD-10-CM

## 2023-04-15 DIAGNOSIS — R10.84 GENERALIZED ABDOMINAL PAIN: ICD-10-CM

## 2023-04-15 DIAGNOSIS — R11.2 NAUSEA WITH VOMITING, UNSPECIFIED: ICD-10-CM

## 2023-04-15 PROCEDURE — 99233 SBSQ HOSP IP/OBS HIGH 50: CPT | Performed by: NURSE PRACTITIONER

## 2023-04-17 ENCOUNTER — INPATIENT HOSPITAL (OUTPATIENT)
Dept: URBAN - METROPOLITAN AREA HOSPITAL 50 | Facility: HOSPITAL | Age: 73
End: 2023-04-17
Payer: MEDICAID

## 2023-04-17 DIAGNOSIS — D50.0 IRON DEFICIENCY ANEMIA SECONDARY TO BLOOD LOSS (CHRONIC): ICD-10-CM

## 2023-04-17 DIAGNOSIS — R93.3 ABNORMAL FINDINGS ON DIAGNOSTIC IMAGING OF OTHER PARTS OF DI: ICD-10-CM

## 2023-04-17 DIAGNOSIS — R11.2 NAUSEA WITH VOMITING, UNSPECIFIED: ICD-10-CM

## 2023-04-17 DIAGNOSIS — R10.84 GENERALIZED ABDOMINAL PAIN: ICD-10-CM

## 2023-04-17 PROCEDURE — 99233 SBSQ HOSP IP/OBS HIGH 50: CPT | Performed by: NURSE PRACTITIONER

## 2023-09-26 PROBLEM — C56.9 OVARIAN CANCER (MULTI): Status: ACTIVE | Noted: 2023-01-01

## 2023-10-10 NOTE — TELEPHONE ENCOUNTER
"Reciving call from Bacilio.  He states Merna is having increased nausea and weakness.  Requesting to cancel this week's fuv with Dr. Alcazar and scheduled cycle 4 day 1 Abrax/Gemzar.  States she is only receiving zofran in the facility and the zofran is only helping very short term.  Unable to eat or drink well due to the nausea which is leading to more weakness.  Advised to ask the nurse at the facility to obtain order for an additional antiemetic suggesting compazine to alternate with zofran.  Bacilio also concerned that merna is getting dehydrated.  Thompson shows dark \"marilee/brown\" urine and the facility sent a urine and is still pending results.  Bacilio will call the facility and speak to merna's nurse for additional orders.     Additional call received from Bacilio.  He spoke to merna's nurse Raji at [facility] and they are asking for a compazine order from Dr. Alcazar faxed to them at [phone].  Will forward message to dr. Alcazar for Compazine RX.   "

## 2023-10-17 PROBLEM — M27.2 OSTEOMYELITIS OF MANDIBLE: Status: ACTIVE | Noted: 2023-01-01

## 2023-10-17 PROBLEM — M87.9 OSTEONECROSIS OF MANDIBLE (MULTI): Status: ACTIVE | Noted: 2023-01-01

## 2023-10-18 NOTE — PROGRESS NOTES
"Merna Farmer, 73 y.o. female is here for chemotherapy infusion of: {med chemotherapy:940742}  Cycle: {numbers:507260}, Week: {numbers:461852}, Day: {numbers:586141}         No results for input(s): \"BILITOT\", \"AST\", \"ALT\", \"NEUTROABS\", \"PLT\", \"CREATININE\", \"HCGQUANT\" in the last 72 hours.   BP Readings from Last 1 Encounters:   06/29/23 121/61       ECHO  No results found for this or any previous visit from the past 1095 days.       Additional diagnostic tests: ***    Cycle/Treatment plan  Treatment Details   Treatment goal [No plan goal]   Plan Name Venous Access Orders   Status Active   Start Date 10/12/2023   End Date Until discontinued   Provider John Alcazar MD   Chemotherapy [No matching medication found in this treatment plan]     Treatment Details   Treatment goal [No plan goal]   Plan Name Albumin-bound PACLitaxel / Gemcitabine (Day 1, 8, and 15), 28 Day Cycles   Status Active   Start Date 10/12/2023 (Planned)   End Date 8/29/2024 (Planned)   Provider John Alcazar MD   Chemotherapy dexAMETHasone (Decadron) injection 10 mg, 10 mg, intravenous, Once, 1 of 13 cycles    gemcitabine (Gemzar) IV 1,239 mg, 750 mg/m2 = 1,239 mg (75 % of original dose 1,000 mg/m2), intravenous, Once, 0 of 12 cycles  Dose modification: 750 mg/m2 (original dose 1,000 mg/m2, Cycle 1)    albumin-bound PACLitaxel 165 mg IV, 100 mg/m2 = 165 mg (80 % of original dose 125 mg/m2), intravenous, Once, 0 of 12 cycles  Dose modification: 100 mg/m2 (original dose 125 mg/m2, Cycle 1, Reason: Palliative, Comment: pc order set being used for ovarian cancer)    palonosetron (Aloxi) injection 250 mcg, 250 mcg, intravenous, Once, 1 of 13 cycles         Treatment Parameters:    Last Dose:    dexAMETHasone (Decadron) injection 10 mg, 10 mg, intravenous, Once, 1 of 13 cycles        gemcitabine (Gemzar) IV 1,239 mg, 750 mg/m2 = 1,239 mg (75 % of original dose 1,000 mg/m2), intravenous, Once, 0 of 12 cycles    Dose modification: 750 mg/m2 (original dose " 1,000 mg/m2, Cycle 1)        albumin-bound PACLitaxel 165 mg IV, 100 mg/m2 = 165 mg (80 % of original dose 125 mg/m2), intravenous, Once, 0 of 12 cycles    Dose modification: 100 mg/m2 (original dose 125 mg/m2, Cycle 1, Reason: Palliative, Comment: pc order set being used for ovarian cancer)        palonosetron (Aloxi) injection 250 mcg, 250 mcg, intravenous, Once, 1 of 13 cycles        Treatment Interval appropriate:  {YES/NO:00475}  Does diagnosis match the protocol treatment?  {YES/NO:55445}  Regimen validation: (IF NEEDED)  Is current BSA and/or weight within 10% of original order:   {YES/NO:09361}  Does this patient have any pertinent Allergies or medication interaction?   {YES/NO:93401}  Are pretreatment parameters met:  {YES/NO:46748}  Has dosing been modified?  {YES/NO:87892}  IF YES, REASON GIVEN?  Are doses the same as cycle prior?   {YES/NO:83473}  ***if  no, what is the variance %?   COMMENTS:

## 2023-10-18 NOTE — TELEPHONE ENCOUNTER
Call placed to Merna to check on status for coming in for chemo treatment tomorrow (Abrax/[place]).  Merna states she is feeling better and one more dose of antibiotics from latest UTI.  States she continues to be weak but wants to come in for treatment.  Pt added on to see Dr. Alcazar tomorrow to allow clearance prior to going to Infusion. Pt informed to come in at 8:30 tomorrow.  Patient verbalizes understanding of plan of care via teachback method.

## 2023-10-19 NOTE — PROGRESS NOTES
Patient Visit Information:   Visit Type: Follow Up Visit      Cancer History:   Treatment Synopsis:    MRI of lumbosacral spine on August 31, 2015 revealed numerous focal areas of abnormal diminished bone marrow signal on the T1-weighted images compatible  with osseous metastatic disease scattered throughout the visualized osseous structures including the lower thoracic, lumbar, and sacral vertebral bodies, right sacral alar, as well as within the bilateral iliac bones     A 65-year-old woman with history of of bronchial asthma, arthritis, depression, hypertension, and hyperlipidemia. The patient has not been feeling well for the last 2 months.  She claims that her appetite is not good and moreover,, when she starts eating  she has coughng spells and feels nauseous. Consequently, her oral intake has dropped and she lost close to 40 pounds.. She also complains of low-grade fevers, and night sweats. A physical examination in your office revealed generalized lymphadenopathy  including cervical, axillary and inguinal lymphadenopathy. She is scheduled to for a left axillary lymph node biopsy on August 2015. Her last mammogram was in August 2014.     On October 7, 2015 the patient was receiving cycle 2 day #1 and developed reaction to Taxol evidenced by flushing. Taxol held and flushing resolved. She tolerated Taxol at a slower rate of infusion.     Patient allergic to Taxol in November 4, 2015. Taxol discontinued. Patient placed on Abraxane and carboplatin continued.     The patient was admitted to the hospital in October 2015 with cellulitis and swelling of left leg. A Doppler ultrasound revealed deep vein thrombosis of the left leg. Patient  was placed on heparin and subsequently on Coumadin      Patient complained of tingling numbness in fingers left hand greater than right area carboplatin discontinued on November 23, 2016     The patient was admitted to the hospital in November 2016 with right leg cellulitis  which has resolved.        The patient did see Dr. Chao for consideration of debulking surgery. The patient however declined. She is  requesting to go on Abraxane, carboplatin and Avastin.        The patient was evaluated at [facility] for possible osteonecrosis of the left ramus of the mandible. A CT scan showed possible abscess lesion. The patient was offered surgical debridement versus clinical follow-up. The patient chose to go with  clinical follow-up.  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 Stage IV disease. CA-125 elevated at 5500.     Extensive bilateral lower cervical, axillary, mediastinal and hilar lymphadenopathy. Confluent dominant left axillary lymph node or mass measuring 5.4 x 3.2 cm. Dominant right lower cervical lymph node measures 1.4 and left lower cervical lymph node measures  1.7 . Subcarinal lymph node mass measures 3 cm and dominant right hilar node measures 2.8 cm in short axis area the mediastinal lymph nodes involved the superior mediastinum, paratracheal, AP window, posterior mediastinum and subcarinal regions.     Large complex mass in the pelvis with mixed cystic and solid components completely replacing the uterus and the bilateral ovaries. The more solid component is centrally placed and it appears to be infiltrating the uterus normal uterine controls are not  delineated the central solid component measures 12 x 9.4 cm. The right adnexal cystic component with peripheral cyst or calcification measures 12.3 x 10.5 x 7.7 cm. There are a few small pockets of air within the cystic mass from previous biopsy. The  left adnexal complex cystic component measures 6.3 x 2.8 cmcm in short axis .     Extensive bilateral internal iliac, external iliac and common iliac and retroperitoneal lymphadenopathy.  Retroperitoneal lymphadenopathy extends from the level of celiac trunk to the aortic bifurcation. Also bilateral minimally enlarged inguinal lymph  nodes. Liver has 2 small ill-defined hypodensities measuring 8 mm in segment 4 and 5 mm in segment 8. Not typical appearance of cysts. No evidence of ascites or peritoneal metastatic implants.        On 12/11/2019 the patient underwent extraction of lower teeth and resection of the left ramus of the mandible with metal graft. Pathological findings consistent with osteomyelitis. Placed on Ertapenem 1 g QD for 6 weeks.               Treatment History:    The patient was originally diagnosed with stage IV ovarian cancer in August 2015. She had metastasis to multiple lymph nodes, bones, retroperitoneal lymph nodes,  large pelvic mass and  of 5500. She was started on a chemotherapy regimen using Taxol, carboplatin, and Zometa. She developed reaction to both Taxol  which was later discontinued. Details in a bone marrow. She was then placed on Abraxane, carboplatin.  The patient was offered debulking surgery but declined. Later and Avastin was added. She developed reaction to carboplatin as well and it was discontinued. The patient is now being treated with Abraxane and Avastin maintenance therapy. Her CA-125 has  normalized.  Resection of left ramus of mandible in December 2019. Found to have osteomyelitis of the bone. All lower teeth were extracted.  MRI of liver on July 8, 2020 revealed 2. peripherall enhancing lesions measuring 1.9 cm each. Recommended resuming maintenance therapy with Abraxane and Avastin.  CT scan of chest abdomen pelvis in April 2023 revealed progressive disease in the bones as well as liver.  The patient has missed many months of therapy due to intercurrent illness.  Requested resuming Abraxane, Avastin in April 2023, Xgeva every 3 months.  The patient was admitted to hospital in early May 2023 with abdominal pain.  CT scan revealed possible free  air in peritoneum.  The patient received intravenous antibiotics and managed conservatively.  Avastin discontinued.  Progressive disease evidenced by elevated tumor markers and CT scan.  Patient placed on dose reduced gemcitabine and Abraxane day 1, day 8, day 15 every 28 days beginning June 28, 2023.  The patient developed MRSA in previous laparotomy site and umbilicus, and UTI admitted to the hospital in July 2023.           History of Present Illness:      ID Statement:    JAMEEL SALVADOR is a 73 year old Female        Chief Complaint: Follow-up of Metastatic ovarian  carcinoma   Interval History:    The  patient had come for a follow up on 4/13/23 of metastatic ovarian carcinoma. She has been receiving maintenance chemotherapy using Abraxane and Avastin and has  been tolerating well. She underwent a left cataract surgery and has recovered well. Continues to feel weak and tired, short of breath on minimal exertion.  Cardiology evaluation did not reveal any abnormalities.  Pulmonary evaluation also did not reveal  any abnormalities.  Patient was placed on Singulair with which she is feeling much better.  Left arm lymphedema etiology unknown.  Elastic sleeve recommended for left arm.  Teeth extracted without any problems.  Xgeva was held at that time. Port was replaced.  The patient presents back to restart chemotherapy.       The patient had come for follow-up on May 18, 2023 regarding history of metastatic ovarian carcinoma.  The patient was receiving maintenance therapy using Abraxane day 1 day 8-day 15 every 28 days with Avastin every 2 weeks.  In early May 2023 she was  admitted to the hospital with abdominal pain.  CT scan revealed free air in peritoneum.  The patient was managed conservatively with intravenous antibiotics and discharged to nursing home.  She is receiving physical therapy.  She was brought in a wheelchair  with indwelling Thompson catheter.     The patient and family had come for follow-up on June  28, 2023 regarding history of metastatic ovarian carcinoma.  The patient has had stormy course for last 6 months or more.  Patient has had several admissions to the hospital and thus cannot get a complete  cycle in timely fashion.  A week earlier the patient was admitted to the hospital with UTI received intravenous antibiotics and discharged to nursing home on oral antibiotics which she completed 4 days ago.  She is beginning to feel better.  She has seasonal  allergies and postnasal drip.  We have noted progressive increase in Ca1 25 which were previously normal and CT scans revealing progressive disease.     The patient and her brother had come for follow-up on 10/19/2023 regarding history of metastatic ovarian carcinoma.  She had progressive disease and was placed on gemcitabine and Abraxane beginning of June 2023.  She developed MRSA infection and umbilicus  at the previous laparotomy.  She was admitted to the hospital received antibiotics and discharged.  The patient was in a nursing home has recovered from a UTI as well as URI received Levaquin.  Today, the patient feels better claims to be back to baseline.           Past medical history  1. Osteonecrosis of mandible  2. DVT  3. Anemia  4. Back pain  5. Ovarian cancer  6. Pelvic mass     Surgical hx:  1. Tooth extraction   2. Port replacement   3.  Hernia repair on March 27, 2023.                                   Review of Systems:   ·  System Review All other systems have been reviewed and are negative for complaint.            Allergies and Intolerances:       Allergies:         codeine: Drug, Unknown, Active       Intolerances:         sulfa drugs: Drug Category, Unknown, Active     Outpatient Medication Profile:  * Patient Currently Takes Medications as of 14-Sep-2023 08:26 documented in Structured Notes         ondansetron 8 mg oral tablet : Last Dose Taken:  , 1 tab(s) orally every 8 hours as needed for nausea , Start Date: 16-Feb-2023         Thigh  High Compression Stockings: Last Dose Taken:  , Low Pressure -  20-30mmhg                  Diagnosis -          HX of DVT Z86.718         Impaired Mobility Z74.09         Ovarian Cancer C56.9         , Start Date: 28-Jul-2022         Incentive spirometer x10 every hour while awake: Last Dose Taken:  ,  Start Date: 12-May-2022         Compression sleeve and gauntlet full length for left: low pressure 20-30mmhg : Last Dose Taken:  , Low compression: 20-30mmhg                  Lymphedema of arm I89.0, Start Date: 26-Apr-2022         Daily Multiple Vitamins oral tablet: Last Dose Taken:  , Pharmacy please  dispense multivitamin without iron.         1 tab(s) oral once a day, Start Date: 12-Jan-2022         Xarelto 10 mg oral tablet: Last Dose Taken:  , 1 tab orally once a day  , Start Date: 07-Dec-2021         lidocaine-prilocaine 2.5%-2.5% topical cream: Last Dose Taken:  , Apply  topically to affected area one hour prior to mediport use and cover with a nonabsorbant dressing,  prn , Start Date: 14-Jan-2021         azelastine 137 mcg/inh (0.1%) nasal spray: Last Dose Taken:  , 2 spray(s)  nasal 2 times a day         acetaminophen 650 mg oral tablet, extended release: Last Dose Taken:   , 1 tab(s) oral every 8 hours as needed         PARoxetine 20 mg oral tablet: Last Dose Taken:  , 1 tab(s) oral once  a day         hydroxychloroquine 200 mg oral tablet: Last Dose Taken:  , 1 tab(s) orally  once a day         PACLitaxel protein-bound 100 mg intravenous injection: Last Dose Taken:            Zofran 32 mg/50 mL-D5% intravenous solution: Last Dose Taken:           Pepcid 10 mg/mL intravenous solution: Last Dose Taken:           Xgeva: Last Dose Taken:  , 120 milligram(s) subcutaneous every 3 months         Neulasta Onpro Kit 6 mg/0.6 mL subcutaneous solution: Last Dose Taken:   , 1 kit subcutaneous every 28 days on day 15 of chemotherapy         glucosamine: Last Dose Taken:  , 500mg/400mg oral twice a day.          olopatadine 0.1% ophthalmic solution: Last Dose Taken:  , 1 drop(s) to  each affected eye 2 times a day, As Needed         docusate sodium 100 mg oral capsule: Last Dose Taken:  , 1  orally 2  times a day         Vitamin D3 plus calcium citrate 630-400: Last Dose Taken:  , 2 tab(s)  orally once a day         ProAir HFA 90 mcg/inh inhalation aerosol: Last Dose Taken:  , 2 puff(s)  inhaled every 6 hours, As Needed         amLODIPine 10 mg oral tablet: Last Dose Taken:  , 1 tab(s) orally once  a day         Singulair 10 mg oral tablet: Last Dose Taken:  , 1 tab(s) orally once  a day         benzonatate 100 mg oral capsule: Last Dose Taken:  , 1 cap(s) orally  3 times a day         Tubersol 1 tuberculin units/0.1 mL intradermal solution: Last Dose Taken:            ZyrTEC 10 mg oral tablet: Last Dose Taken:  , 1 tab(s) orally once a  day         Flovent 110 mcg/inh inhalation aerosol with adapter: Last Dose Taken:   , 2 puff(s) inhaled 2 times a day         omeprazole 40 mg oral delayed release capsule: Last Dose Taken:  , 1  cap(s) orally 2 times a day             Medical History:         Submandibular abscess: ICD-10: K12.2, Status: Active         History of deep venous thrombosis: ICD-10: Z86.718, Status:  Active         Anemia due to other cause: ICD-10: D64.89, Status: Active         Back pain: ICD-10: M54.9, Status: Active         Ovarian cancer: ICD-10: C56.9, Status: Active         Pelvic mass: ICD-10: R19.00, Status: Active       Surg History:         History of incision and drainage: ICD-10: Z98.890, Status:  Active         Palliative chemotherapy underway: ICD-10: Z51.5, Status: Active        Social History:   Social Substance History:  ·  Smoking Status never smoker (1)            Vitals and Measurements:   Vitals: Temp: 36.2  HR: 84  RR: 20  BP: 101/84  SPO2%:   93   Measurements: HT(cm): 144.4  WT(kg): NA  BSA: NA   BMI:  NA      Physical Exam:      Constitutional: Well developed, awake/alert/oriented  x3,  no distress, alert and cooperative   Eyes: PERRL, EOMI, clear sclera   ENMT: mucous membranes moist, no apparent injury,  no lesions seen   Head/Neck: Neck supple, no apparent injury, thyroid  without mass or tenderness, No JVD, trachea midline, no bruits   Respiratory/Thorax: Patent airways, CTAB, normal  breath sounds with good chest expansion, thorax symmetric   Cardiovascular: Regular, rate and rhythm, no murmurs,  2+ equal pulses of the extremities, normal S 1and S 2   Gastrointestinal: Nondistended, soft, non-tender,  no rebound tenderness or guarding, no masses palpable, no organomegaly, +BS, no bruits   Genitourinary: No Discharge, vesicles or other abnormalities   Musculoskeletal: ROM intact, no joint swelling, normal  strength   Extremities: normal extremities, no cyanosis edema,  contusions or wounds, no clubbing   Neurological: alert and oriented x3, intact senses,  motor, response and reflexes, normal strength   Breast: No masses, tenderness, no discharge or discoloration   Lymphatic: No significant lymphadenopathy   Psychological: Appropriate mood and behavior   Skin: Warm and dry, no lesions, no rashes         Lab Results:     ·  Results        CBC date/time       WBC     HGB     HCT     PLT     Neut      17-Aug-2023 09:09   3.7(L)  8.9(L)  28.3(L) 129(L)  2.12     BMP date/time       NA              K               CL              CO2             BUN             CREAT             17-Aug-2023 09:09   142             3.9             105             N/A             14              0.37(L)     Hepatic date/time   T Pro   T Bili  AST     ALT     ALKP    ALB       20-Aug-2020 06:57   6.3(L)  0.5     N/A     18      222(H)   3.7     LDH date/time       LDH     20-Aug-2020 06:57   N/A     Radiology Result:     ·  Results           Impression:     1. Two peripherally enhancing hepatic lesions measuring up to 1.9 cm.  Given the patient'shistory of ovarian cancer, these lesions are  highly suspicious for  metastatic disease.  2. Partial visualization of a large pelvic mass consistent with the  patient's history of metastatic ovarian cancer and better  characterized on prior cross-sectional imaging.      MRI Liver w/wo Contrast [Jul 8 2020  3:30PM]        Impression:     CHEST:  1.  No focal infiltrate, pulmonary nodule or lymphadenopathy  identified.        ABDOMEN-PELVIS:  1.  Heterogeneous mass in the pelvis, similar to minimally decreased  in size when compared to the prior study.  2. Ill-defined regions of hypodensity within the liver, as described  above. These may represent new areas of fatty infiltration or  metastatic disease. Further evaluation with multiphase contrast MRI  of the liver is recommended.  3. No evidence of bowel obstruction, free intraperitoneal air or  abnormal intra-abdominal fluid collection.      CT Chest Abdomen Pelvis with IV Contrast [Jun 23 2020 10:36AM]        Assessment and Plan:      Assessment and Plan:   Assessment:    The patient had come for follow-up on 12/29/2022.      1. Metastatic ovarian carcinoma, Stage IV with large pelvic mass  Was receiving Abraxane and Avastin maintenance therapy. It was held for her surgery and seems to be doing well since. Her  was 24.2 in 02/2020 and 25.3 in 03/2020.  CEA was 2.1 in 2/2020, 2.9 in 03/2020. Two peripherally enhancing hepatic lesions  measuring up to 1.9 cm. Given the patient's history of ovarian cancer, these lesions are highly suspicious for metastatic disease. I have recommended port placement and resuming therapy with Abraxane and Avastin.       The patient had come for a follow up on 4/13/2023. Physical exam was within normal limits. I reviewed the lab data with the patient. C  She is receiving Abraxane once per week, 3 weeks on, 1 week off, and Avastin every 3 weeks. Effects and side effects  reviewed. Tumor markers have normalized at this time. CT scan obtained on 05/27/2021 revealed bilateral axillary lymphadenopathy  likely related to the COVID-19 vaccination. CT scan revealed decrease in size of liver lesion.  Recent CT chest 12/21 did  not reveal any new lesion.  Right subclavian [id number]-u-Odgk skin seems to have ulcerated with possible discharge. She has undergone port removal and port replacement.  The patient has completed dental work and will now initiate Xgeva 120 mg SQ every 3  months.  The patient reports doing better and is ready to reinstate her chemotherapy regimen using Abraxane once per week, 3 weeks on, 1 week off, and Avastin every 3 weeks.  Restaging CT scan of chest abdomen pelvis in April 2023 revealed findings consistent  with progressive disease in the bones as well as liver.  I had a detailed discussion with the patient explained to her that most likely disease progression is due to missed treatments.  If agreeable would like to resume Abraxane and Avastin and Xgeva.   The patient is agreeable the patient had hernia surgery on March 27, 2023 we will therefore hold Avastin for the cycle continue Abraxane day 1 day 8-day 15 and return in 4 weeks.     The patient and her brother had come for follow-up on May 18, 2023 regarding history of metastatic ovarian carcinoma.  The patient is currently receiving maintenance therapy using Abraxane day 1 day 8-day 15, Avastin every 2 weeks with Neulasta support.   In early May 2023 she was admitted to the hospital with abdominal pain and CT scan revealed free air in the peritoneum.  The patient was managed conservatively and with intravenous antibiotics she was discharged to nursing home where she is receiving  physical therapy.  She has completed the course of antibiotics and has presented for further evaluation and management.  She was brought in a wheelchair with an indwelling Thompson catheter.  I had a detailed discussion with the patient and family explained  to them that given the history of possibility of intestinal perforation we recommend discontinuing Avastin.   Continue Abraxane day 1 day 8-day 15 with Neulasta support.  The patient and family are agreeable.     The patient and family had come for follow-up on June 28, 2023 regarding history of metastatic ovarian carcinoma.  The patient has had stormy course for last 6 months or more.  Patient has had several admissions to the hospital and thus cannot get a complete  cycle in timely fashion.  A week earlier the patient was admitted to the hospital with UTI received intravenous antibiotics and discharged to nursing home on oral antibiotics which she completed 4 days ago.  She is beginning to feel better.  She has seasonal  allergies and postnasal drip.  We have noted progressive increase in Ca1 25 which were previously normal and CT scans revealing progressive disease.  I explained to them that it would be prudent to discontinue current therapy and consider gemcitabine,  Abraxane based therapy.  Will initiate therapy on June 28, 2023.  Gemcitabine reduced to 750 mg per metered squared, Abraxane reduced to 100 mg per metered squared day 1 day 8, 15 every 28 days.  The patient and family are agreeable.  Return in 1 week.     The patient and family had come for follow-up on 10/19/2023 regarding history of metastatic ovarian carcinoma.  The patient was noted to have progressive disease and was started on chemotherapy using gemcitabine and Abraxane in June 2023.  The patient  then developed MRSA infection in previous laparotomy site at umbilicus.  She was admitted to the hospital received antibiotics and subsequently discharged.  Today, the patient is feeling better and claims to be back at baseline CBC on September 14, 2023  revealed WBC 12.8 hemoglobin 8.9 g/dL platelets 2 97,000/mm³.  The patient to receive cycle 4#1, gemcitabine Abraxane.  Return once a week for 3 weeks 1 week off see me in 4 weeks.       2. Anemia, with iron deficiency component. Resolved     3. Osteomyelitis of the left ramus of the mandible  S/p resection  and plutoneum  graft and screws.    Surgical removal of oral hardware.      4. DVT/PE  Continue Xarelto 10 mg.   5. HTN  Continue amlodipine 5 mg.      6.  History of atrial fibrillation     7. Hypercalcemia:  Has received Zometa in the past.  Calcium levels have normalized.

## 2023-10-19 NOTE — PROGRESS NOTES
Patient seen by Dr. Alcazar in clinic today.  To continue with Abraxane/Gemzar cycle 4 day 1 today as pt reports she is feeling better from last UTI and completing her last dose antibiotics today.  Infusion appts adjusted.  To return for FUV on 11/16/23.  Schedulers notified to provide written appts.

## 2023-10-19 NOTE — SIGNIFICANT EVENT
10/19/23 0953   Prechemo Checklist   Has the patient been in the hospital, ED, or urgent care since last date of service No   Chemo/Immuno Consent Signed Yes   Protocol/Indications Verified Yes   Confirmed to previous date/time of medication Yes   Compared to previous dose Yes   All medications are dated accurately Yes   Parameters Met Yes   BSA/Weight-Height Verified Yes   Dose Calculations Verified Yes

## 2023-10-20 NOTE — TELEPHONE ENCOUNTER
Patient notified not sure when orders entered we will be able to add but will make sure they are added for next time.

## 2023-10-26 NOTE — PROGRESS NOTES
Patient is here today for infusion - no complication since last being seen-  independant double check done prior to chemotherapy today-   b/h/ lung sounds not auscultated  patient verbalizes understanding of plan of care     Verbalizes understanding today of labs-     Rtc next week as scheduled    Cvad Line flushes well with great blood return flushed per protocol.

## 2023-11-02 NOTE — SIGNIFICANT EVENT
11/02/23 1449   Prechemo Checklist   Has the patient been in the hospital, ED, or urgent care since last date of service Yes   Chemo/Immuno Consent Signed Yes   Protocol/Indications Verified Yes   Confirmed to previous date/time of medication Yes   Compared to previous dose Yes   All medications are dated accurately Yes   Pregnancy Test Negative Not applicable   Parameters Met Yes   BSA/Weight-Height Verified Yes   Dose Calculations Verified Yes

## 2023-11-02 NOTE — PROGRESS NOTES
Patient is here today for infusion - no complication since last being seen-  independant double check done prior to chemotherapy today-   b/h/ lung sounds not auscultated  patient verbalizes understanding of plan of care

## 2023-11-02 NOTE — TELEPHONE ENCOUNTER
Patient called to say her ride is late and she is concerned she wont make her 1pm appt.     I checked with the nurse and she said as long as the patient arrived by 2pm, we would still be able to treat her.     I notified the patient. She will call back if not able to make it by 2pm and reschedule.

## 2023-11-13 NOTE — TELEPHONE ENCOUNTER
Patient called to let us know she is admitted at [facility].  Really sick and has fever of 100.4.    Patient requesting Thursday appointment be cancelled.

## 2023-11-13 NOTE — TELEPHONE ENCOUNTER
"Attempted to reach Merna on mobile phone with no answer.  Call placed to brother Bacilio who states last Friday 11/10/23 pt had increased coughing and nursing home provided a rescue inhaler however the coughing continued.  Bacilio states the coughing was \"excessive and unusual\" as pt usually has chronic cough with post nasal drip.  Pt was transferred from nursing home and admitted to [facility].  Currently has a fever and receiving IV antibiotics with blood cultures pending.      Schedulers notified to cancel Infusion & Dr. Alcazar FUV on 11/16/23.  Bacilio to call back with an update when we can reschedule pt to see Dr. Alcazar pending discharge from hospital.      Bacilio denies further needs at this time.  Bacilio verbalizes understanding of plan of care via teachback method.     " At level greater than 8 on CMP  Benadryl 25 mg for itching  2/1- bilirubin 6.4. improving.

## 2023-11-14 NOTE — TELEPHONE ENCOUNTER
Informed Dr. Alcazar of patients admission to [facility] for fever and persistent cough.  No new orders received.

## 2023-11-21 NOTE — TELEPHONE ENCOUNTER
Call returned to patient.  Confirmed appts on 11/30/23:  Dr. Alcazar FUV @ 12:00 with arrival at 11/40  Infusion @ 1:00 but this may change after speaking with Dr. Alcazar.    Pt appreciative of the return call and confirmation.  Michelle from scheduling notified and is still working on making this scheduling changes to reflect schedule above.

## 2023-11-21 NOTE — TELEPHONE ENCOUNTER
Call to Merna to change FUV time on 11/30/23 as Infusion appt is currently scheduled at 1:00.    FUV with Dr. Alcazar to be changed to 12:00 on 11/30/23 with arrival to unit at 11:40.  Pt verbalizes understanding via teachback method.  Pt states that she is still on Vancomycin orally for C.Diff and doesn't think she will be receiving treatment anyway.  Informed pt we will keep the Infusion appt at this time till seen by Dr. Alcazar on 11/30/23.  Patient verbalizes understanding of plan of care via teachback method.

## 2023-11-21 NOTE — TELEPHONE ENCOUNTER
Dr. Alcazar patient. Patient called says is confused regarding her appointments. She didn't think she was getting treatment and was told by scheduling that she was on 11-30-23.  Please call her at [phone]

## 2023-11-21 NOTE — TELEPHONE ENCOUNTER
"Call placed to patient to follow up since being hospitalized last week at [facility] for worsening cough and fever.      Merna states she was discharged last night back to the nursing facility.  Reports she was diagnosed with C.diff, UTI, continuing Afib, pneumonia.  States Dr. Aiken from [facility] stated her \"cancer in the liver is growing and may need to change treatment method\".      Message being forwarded to Dr. Alcazar along with schedulers to alter appts.  Pt wants to CANCEL 11/24/23 Infusion appt.  FUV to be added on for 11/30/23 with possible treatment.  Patient verbalizes understanding of plan of care via teachback method.     "

## 2023-11-27 PROBLEM — M75.100 TEAR OF SUPRASPINATUS TENDON: Status: ACTIVE | Noted: 2019-05-04

## 2023-11-27 PROBLEM — J45.909 UNSPECIFIED ASTHMA, UNCOMPLICATED (HHS-HCC): Status: ACTIVE | Noted: 2022-02-11

## 2023-11-27 PROBLEM — K43.6 IRREDUCIBLE VENTRAL HERNIA: Status: ACTIVE | Noted: 2023-03-27

## 2023-11-27 PROBLEM — R50.9 FEVER: Status: ACTIVE | Noted: 2017-01-13

## 2023-11-27 PROBLEM — J45.20 INTRINSIC ASTHMA WITHOUT STATUS ASTHMATICUS (HHS-HCC): Status: ACTIVE | Noted: 2023-01-01

## 2023-11-27 PROBLEM — R10.9 ABDOMINAL PAIN: Status: ACTIVE | Noted: 2023-01-01

## 2023-11-27 PROBLEM — I50.42 CHRONIC COMBINED SYSTOLIC AND DIASTOLIC CHF (CONGESTIVE HEART FAILURE) (MULTI): Chronic | Status: ACTIVE | Noted: 2023-03-26

## 2023-11-27 PROBLEM — M81.0 OSTEOPOROSIS: Status: ACTIVE | Noted: 2023-01-01

## 2023-11-27 PROBLEM — R74.8 ELEVATED ALKALINE PHOSPHATASE LEVEL: Status: ACTIVE | Noted: 2023-01-01

## 2023-11-27 PROBLEM — I25.10 CORONARY ATHEROSCLEROSIS: Status: ACTIVE | Noted: 2023-01-01

## 2023-11-27 PROBLEM — C56.9 MALIGNANT NEOPLASM OF UNSPECIFIED OVARY (MULTI): Status: ACTIVE | Noted: 2022-02-11

## 2023-11-27 PROBLEM — J18.9 PNEUMONIA, UNSPECIFIED ORGANISM: Status: ACTIVE | Noted: 2022-02-11

## 2023-11-27 PROBLEM — K44.9 DIAPHRAGMATIC HERNIA WITHOUT OBSTRUCTION OR GANGRENE: Status: ACTIVE | Noted: 2022-02-11

## 2023-11-27 PROBLEM — R53.1 GENERALIZED WEAKNESS: Status: ACTIVE | Noted: 2017-01-18

## 2023-11-27 PROBLEM — H25.10 NUCLEAR SENILE CATARACT: Status: ACTIVE | Noted: 2020-12-07

## 2023-11-27 PROBLEM — E66.9 OBESITY, CLASS I, BMI 30-34.9: Status: ACTIVE | Noted: 2019-04-21

## 2023-11-27 PROBLEM — R11.0 NAUSEA: Status: ACTIVE | Noted: 2019-04-22

## 2023-11-27 PROBLEM — F32.A DEPRESSION: Status: ACTIVE | Noted: 2023-01-01

## 2023-11-27 PROBLEM — R53.1 ASTHENIA: Status: ACTIVE | Noted: 2017-01-18

## 2023-11-27 PROBLEM — F33.9 MAJOR DEPRESSIVE DISORDER, RECURRENT, UNSPECIFIED (CMS-HCC): Status: ACTIVE | Noted: 2022-02-18

## 2023-11-27 PROBLEM — M41.9 SCOLIOSIS, UNSPECIFIED: Status: ACTIVE | Noted: 2022-02-11

## 2023-11-27 PROBLEM — E78.5 HYPERLIPIDEMIA: Chronic | Status: ACTIVE | Noted: 2023-01-01

## 2023-11-27 PROBLEM — E87.6 HYPOKALEMIA: Status: ACTIVE | Noted: 2023-03-19

## 2023-11-27 PROBLEM — D72.829 LEUKOCYTOSIS (LEUCOCYTOSIS): Status: ACTIVE | Noted: 2017-01-18

## 2023-11-27 PROBLEM — M81.0 OSTEOPOROSIS WITHOUT CURRENT PATHOLOGICAL FRACTURE: Status: ACTIVE | Noted: 2021-04-29

## 2023-11-27 PROBLEM — Z86.718 PERSONAL HISTORY OF OTHER VENOUS THROMBOSIS AND EMBOLISM: Status: ACTIVE | Noted: 2022-02-11

## 2023-11-27 PROBLEM — R78.81 BACTEREMIA: Status: ACTIVE | Noted: 2022-01-31

## 2023-11-27 PROBLEM — K43.9 VENTRAL HERNIA WITHOUT OBSTRUCTION OR GANGRENE: Status: ACTIVE | Noted: 2023-03-27

## 2023-11-27 PROBLEM — E66.9 OBESITY, UNSPECIFIED: Status: ACTIVE | Noted: 2022-02-11

## 2023-11-27 PROBLEM — C78.00 SECONDARY MALIGNANT NEOPLASM OF UNSPECIFIED LUNG (MULTI): Status: ACTIVE | Noted: 2022-02-11

## 2023-11-27 PROBLEM — K21.9 GASTRO-ESOPHAGEAL REFLUX DISEASE WITHOUT ESOPHAGITIS: Status: ACTIVE | Noted: 2022-02-11

## 2023-11-27 PROBLEM — L65.9 NONSCARRING HAIR LOSS, UNSPECIFIED: Status: ACTIVE | Noted: 2022-02-11

## 2023-11-27 PROBLEM — M19.90 UNSPECIFIED OSTEOARTHRITIS, UNSPECIFIED SITE: Status: ACTIVE | Noted: 2022-02-11

## 2023-11-27 PROBLEM — H25.10 NUCLEAR SCLEROTIC CATARACT: Status: ACTIVE | Noted: 2020-12-07

## 2023-11-27 PROBLEM — E86.0 DEHYDRATION: Status: ACTIVE | Noted: 2023-03-26

## 2023-11-27 PROBLEM — R13.10 DYSPHAGIA: Status: ACTIVE | Noted: 2022-02-11

## 2023-11-27 PROBLEM — S46.011D: Chronic | Status: ACTIVE | Noted: 2019-05-04

## 2023-11-27 PROBLEM — R32 URINARY INCONTINENCE: Status: ACTIVE | Noted: 2023-01-01

## 2023-11-27 PROBLEM — R26.2 DIFFICULTY IN WALKING, NOT ELSEWHERE CLASSIFIED: Status: ACTIVE | Noted: 2022-02-11

## 2023-11-27 PROBLEM — E78.5 HYPERLIPIDEMIA, UNSPECIFIED: Status: ACTIVE | Noted: 2022-02-11

## 2023-11-27 PROBLEM — I10 ESSENTIAL HYPERTENSION: Status: ACTIVE | Noted: 2022-02-11

## 2023-11-27 PROBLEM — E87.1 HYPONATREMIA: Status: ACTIVE | Noted: 2023-03-19

## 2023-11-27 PROBLEM — M62.81 MUSCLE WEAKNESS (GENERALIZED): Status: ACTIVE | Noted: 2022-02-11

## 2023-11-27 PROBLEM — J44.0 CHRONIC OBSTRUCTIVE PULMONARY DISEASE WITH ACUTE LOWER RESPIRATORY INFECTION (MULTI): Status: ACTIVE | Noted: 2023-01-01

## 2023-11-27 PROBLEM — M15.9 PRIMARY OSTEOARTHRITIS INVOLVING MULTIPLE JOINTS: Status: ACTIVE | Noted: 2023-01-01

## 2023-11-27 PROBLEM — Z97.8 FOLEY CATHETER IN PLACE: Status: ACTIVE | Noted: 2023-01-01

## 2023-11-27 PROBLEM — I49.3 FREQUENT PVCS: Status: ACTIVE | Noted: 2023-01-01

## 2023-11-27 PROBLEM — R19.5 HEME POSITIVE STOOL: Status: ACTIVE | Noted: 2023-03-26

## 2023-11-27 PROBLEM — J45.30 MILD PERSISTENT ASTHMA WITHOUT COMPLICATION (HHS-HCC): Status: ACTIVE | Noted: 2017-07-27

## 2023-11-27 PROBLEM — C78.7 SECONDARY MALIGNANT NEOPLASM OF LIVER AND INTRAHEPATIC BILE DUCT (MULTI): Status: ACTIVE | Noted: 2022-02-11

## 2023-11-27 PROBLEM — N17.9 ACUTE KIDNEY FAILURE, UNSPECIFIED (CMS-HCC): Status: ACTIVE | Noted: 2022-02-11

## 2023-11-27 PROBLEM — N39.0 UTI (URINARY TRACT INFECTION): Status: ACTIVE | Noted: 2023-01-01

## 2023-11-27 PROBLEM — R06.02 SHORTNESS OF BREATH: Status: ACTIVE | Noted: 2023-01-01

## 2023-11-27 PROBLEM — J98.11 ATELECTASIS: Status: ACTIVE | Noted: 2022-02-11

## 2023-11-27 PROBLEM — J18.9 PNEUMONIA OF LEFT LOWER LOBE DUE TO INFECTIOUS ORGANISM: Status: ACTIVE | Noted: 2022-01-31

## 2023-11-27 PROBLEM — R33.9 URINARY RETENTION: Status: ACTIVE | Noted: 2023-01-01

## 2023-11-27 PROBLEM — R13.12 DYSPHAGIA, OROPHARYNGEAL PHASE: Status: ACTIVE | Noted: 2022-02-11

## 2023-11-27 PROBLEM — M25.511 PAIN IN JOINT OF RIGHT SHOULDER: Status: ACTIVE | Noted: 2019-05-04

## 2023-11-27 PROBLEM — M86.9 OSTEOMYELITIS (MULTI): Status: ACTIVE | Noted: 2020-10-01

## 2023-11-27 PROBLEM — M35.01 SJOGREN'S SYNDROME WITH KERATOCONJUNCTIVITIS SICCA (MULTI): Status: ACTIVE | Noted: 2018-10-24

## 2023-11-27 PROBLEM — R10.9 STOMACH DISCOMFORT: Status: ACTIVE | Noted: 2023-01-01

## 2023-11-27 PROBLEM — I47.10 SVT (SUPRAVENTRICULAR TACHYCARDIA) (CMS-HCC): Status: ACTIVE | Noted: 2023-01-01

## 2023-11-29 NOTE — TELEPHONE ENCOUNTER
Patient calling to cancel appointment for tomorrow.  Just had a little attack of diarrhea and doesn't want to come in with that.

## 2023-12-05 NOTE — TELEPHONE ENCOUNTER
"Call placed to Merna to see if we can get her back on Infusion and Dr. Alcazar's schedule.  Pt states she is \"too weak\" to get out of bed and still having loose/multiple stools and needs \"more time\" to recover from recent C.Diff.  Informed pt she currently has no follow up appts and we will check back in with her next week to see if she is ready to get back on treatment/Dr. Alcazar's schedule.  Pt appreciative of the call today. Patient verbalizes understanding of plan of care via teachback method.     "

## 2023-12-12 NOTE — TELEPHONE ENCOUNTER
Lab orders for CBC, CMP, CEA,  successfully faxed to [facility] at [phone].

## 2023-12-12 NOTE — TELEPHONE ENCOUNTER
"Call returned to Merna to discuss how she has been feeling and if she is ready to schedule a follow up visit with Dr. Alcazar and chemotherapy.    Pt reports she is now having soft stool 2-3 x's per day.  States Dr. Reyes's partner at the facility reported to her yesterday that she no longer has C.Diff.      c/o intermittent abdominal, cramping pain around 4/10 that has been ongoing for a couple weeks now.  States pain medication usually resolves the pain.      Reports 2 open areas on her bottom, size unknown but painful around 7/10 that comes down to 4/10 with use of oxycodone. Denies fever. Denies being on any antibiotics.  Pt not aware of how long she has had these \"open areas\" on her bottom.     Reports intermittent nausea for a couple of weeks that resolves with use of Zantac or Compazine.     Reports she continues to be very weak and unable to stand by herself.  Receiving physical therapy but needs 2 person assist to stand for maybe 20 seconds.      Reports she is eating normal pureed food since 2017 since teeth were pulled but decreased appetite.  Drinking approx 64 oz day with light yellow urine and no s/s UTI at this time.     Pt agreeable to have nursing home draw her pre-treatment labs on 12/26/23.  Will have Dr. Alcazar place orders for labs and will fax to: Christine Chiu [phone]    Pt requesting to come back and see Dr. Alcazar and she would like to receive chemo treatment same day.  Appts sent to schedulers:    12/28/23 FUV with Dr. Alcazar @ 9:30  12/28/23 Infusion [facility] C5D1 delayed @ 10:00, rm 4 per Daniela  "

## 2023-12-14 NOTE — TELEPHONE ENCOUNTER
Raji Chiu, calling to let us know that [facility] does not process CEA or .  Left his cell phone number [phone] to advise.    Spoke with RN we will have them run CBCD and CMP for now.  When patient comes for treatment on 12/28/23 we will drawn markers.    Raji notified and appreciated.

## 2023-12-28 NOTE — TELEPHONE ENCOUNTER
Call placed to Bacilio to advise that I spoke with Dr. Alcazar and he does not want to schedule a follow up visit at this time.  He would like patient to receive Palliative Care with transition to Hospice.  Informed Bacilio to keep us in touch with how Merna is doing and to call with any questions or concerns.  Bacilio verbalized understanding and denies further needs.  Bacilio verbalizes understanding of plan of care via teachback method.

## 2023-12-28 NOTE — PROGRESS NOTES
Cancer History:   Treatment Synopsis:    MRI of lumbosacral spine on August 31, 2015 revealed numerous focal areas of abnormal diminished bone marrow signal on the T1-weighted images compatible  with osseous metastatic disease scattered throughout the visualized osseous structures including the lower thoracic, lumbar, and sacral vertebral bodies, right sacral alar, as well as within the bilateral iliac bones     A 65-year-old woman with history of of bronchial asthma, arthritis, depression, hypertension, and hyperlipidemia. The patient has not been feeling well for the last 2 months.  She claims that her appetite is not good and moreover,, when she starts eating  she has coughng spells and feels nauseous. Consequently, her oral intake has dropped and she lost close to 40 pounds.. She also complains of low-grade fevers, and night sweats. A physical examination in your office revealed generalized lymphadenopathy  including cervical, axillary and inguinal lymphadenopathy. She is scheduled to for a left axillary lymph node biopsy on August 2015. Her last mammogram was in August 2014.     On October 7, 2015 the patient was receiving cycle 2 day #1 and developed reaction to Taxol evidenced by flushing. Taxol held and flushing resolved. She tolerated Taxol at a slower rate of infusion.     Patient allergic to Taxol in November 4, 2015. Taxol discontinued. Patient placed on Abraxane and carboplatin continued.     The patient was admitted to the hospital in October 2015 with cellulitis and swelling of left leg. A Doppler ultrasound revealed deep vein thrombosis of the left leg. Patient  was placed on heparin and subsequently on Coumadin      Patient complained of tingling numbness in fingers left hand greater than right area carboplatin discontinued on November 23, 2016     The patient was admitted to the hospital in November 2016 with right leg cellulitis which has resolved.        The patient did see Dr. Chao for  consideration of debulking surgery. The patient however declined. She is  requesting to go on Abraxane, carboplatin and Avastin.        The patient was evaluated at [facility] for possible osteonecrosis of the left ramus of the mandible. A CT scan showed possible abscess lesion. The patient was offered surgical debridement versus clinical follow-up. The patient chose to go with  clinical follow-up.  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 Stage IV disease. CA-125 elevated at 5500.     Extensive bilateral lower cervical, axillary, mediastinal and hilar lymphadenopathy. Confluent dominant left axillary lymph node or mass measuring 5.4 x 3.2 cm. Dominant right lower cervical lymph node measures 1.4 and left lower cervical lymph node measures  1.7 . Subcarinal lymph node mass measures 3 cm and dominant right hilar node measures 2.8 cm in short axis area the mediastinal lymph nodes involved the superior mediastinum, paratracheal, AP window, posterior mediastinum and subcarinal regions.     Large complex mass in the pelvis with mixed cystic and solid components completely replacing the uterus and the bilateral ovaries. The more solid component is centrally placed and it appears to be infiltrating the uterus normal uterine controls are not  delineated the central solid component measures 12 x 9.4 cm. The right adnexal cystic component with peripheral cyst or calcification measures 12.3 x 10.5 x 7.7 cm. There are a few small pockets of air within the cystic mass from previous biopsy. The  left adnexal complex cystic component measures 6.3 x 2.8 cmcm in short axis .     Extensive bilateral internal iliac, external iliac and common iliac and retroperitoneal lymphadenopathy. Retroperitoneal lymphadenopathy extends from the level of celiac  trunk to the aortic bifurcation. Also bilateral minimally enlarged inguinal lymph  nodes. Liver has 2 small ill-defined hypodensities measuring 8 mm in segment 4 and 5 mm in segment 8. Not typical appearance of cysts. No evidence of ascites or peritoneal metastatic implants.        On 12/11/2019 the patient underwent extraction of lower teeth and resection of the left ramus of the mandible with metal graft. Pathological findings consistent with osteomyelitis. Placed on Ertapenem 1 g QD for 6 weeks.               Treatment History:    The patient was originally diagnosed with stage IV ovarian cancer in August 2015. She had metastasis to multiple lymph nodes, bones, retroperitoneal lymph nodes,  large pelvic mass and  of 5500. She was started on a chemotherapy regimen using Taxol, carboplatin, and Zometa. She developed reaction to both Taxol  which was later discontinued. Details in a bone marrow. She was then placed on Abraxane, carboplatin.  The patient was offered debulking surgery but declined. Later and Avastin was added. She developed reaction to carboplatin as well and it was discontinued. The patient is now being treated with Abraxane and Avastin maintenance therapy. Her CA-125 has  normalized.  Resection of left ramus of mandible in December 2019. Found to have osteomyelitis of the bone. All lower teeth were extracted.  MRI of liver on July 8, 2020 revealed 2. peripherall enhancing lesions measuring 1.9 cm each. Recommended resuming maintenance therapy with Abraxane and Avastin.  CT scan of chest abdomen pelvis in April 2023 revealed progressive disease in the bones as well as liver.  The patient has missed many months of therapy due to intercurrent illness.  Requested resuming Abraxane, Avastin in April 2023, Xgeva every 3 months.  The patient was admitted to hospital in early May 2023 with abdominal pain.  CT scan revealed possible free air in peritoneum.  The patient received intravenous antibiotics  and managed conservatively.  Avastin discontinued.  Progressive disease evidenced by elevated tumor markers and CT scan.  Patient placed on dose reduced gemcitabine and Abraxane day 1, day 8, day 15 every 28 days beginning June 28, 2023.  The patient developed MRSA in previous laparotomy site and umbilicus, and UTI admitted to the hospital in July 2023.           History of Present Illness:      ID Statement:    JAMEEL SALVADOR is a 73 year old Female        Chief Complaint: Follow-up of Metastatic ovarian  carcinoma   Interval History:    The  patient had come for a follow up on 4/13/23 of metastatic ovarian carcinoma. She has been receiving maintenance chemotherapy using Abraxane and Avastin and has  been tolerating well. She underwent a left cataract surgery and has recovered well. Continues to feel weak and tired, short of breath on minimal exertion.  Cardiology evaluation did not reveal any abnormalities.  Pulmonary evaluation also did not reveal  any abnormalities.  Patient was placed on Singulair with which she is feeling much better.  Left arm lymphedema etiology unknown.  Elastic sleeve recommended for left arm.  Teeth extracted without any problems.  Xgeva was held at that time. Port was replaced.  The patient presents back to restart chemotherapy.       The patient had come for follow-up on May 18, 2023 regarding history of metastatic ovarian carcinoma.  The patient was receiving maintenance therapy using Abraxane day 1 day 8-day 15 every 28 days with Avastin every 2 weeks.  In early May 2023 she was  admitted to the hospital with abdominal pain.  CT scan revealed free air in peritoneum.  The patient was managed conservatively with intravenous antibiotics and discharged to nursing home.  She is receiving physical therapy.  She was brought in a wheelchair  with indwelling Thompson catheter.     The patient and family had come for follow-up on June 28, 2023 regarding history of metastatic ovarian carcinoma.  The  patient has had stormy course for last 6 months or more.  Patient has had several admissions to the hospital and thus cannot get a complete  cycle in timely fashion.  A week earlier the patient was admitted to the hospital with UTI received intravenous antibiotics and discharged to nursing home on oral antibiotics which she completed 4 days ago.  She is beginning to feel better.  She has seasonal  allergies and postnasal drip.  We have noted progressive increase in Ca1 25 which were previously normal and CT scans revealing progressive disease.     The patient and her brother had come for follow-up on 10/19/2023 regarding history of metastatic ovarian carcinoma.  She had progressive disease and was placed on gemcitabine and Abraxane beginning of June 2023.  She developed MRSA infection and umbilicus  at the previous laparotomy.  She was admitted to the hospital received antibiotics and discharged.  The patient was in a nursing home has recovered from a UTI as well as URI received Levaquin.  Today, the patient feels better claims to be back to baseline.    The patient and her brother had come for follow-up on December 28, 2023 regarding history of metastatic ovarian carcinoma.  The patient was brought in a wheelchair.  She complains of profound weakness and tiredness and bodyaches and arthralgias.  The patient was admitted to the hospital with pneumonia and UTI and discharged to nursing home.           Past medical history  1. Osteonecrosis of mandible  2. DVT  3. Anemia  4. Back pain  5. Ovarian cancer  6. Pelvic mass     Surgical hx:  1. Tooth extraction   2. Port replacement   3.  Hernia repair on March 27, 2023.                                   Review of Systems:   ·  System Review All other systems have been reviewed and are negative for complaint.            Allergies and Intolerances:       Allergies:         codeine: Drug, Unknown, Active       Intolerances:         sulfa drugs: Drug Category, Unknown,  Active     Outpatient Medication Profile:  * Patient Currently Takes Medications as of 14-Sep-2023 08:26 documented in Structured Notes         ondansetron 8 mg oral tablet : Last Dose Taken:  , 1 tab(s) orally every 8 hours as needed for nausea , Start Date: 16-Feb-2023         Thigh High Compression Stockings: Last Dose Taken:  , Low Pressure -  20-30mmhg                  Diagnosis -          HX of DVT Z86.718         Impaired Mobility Z74.09         Ovarian Cancer C56.9         , Start Date: 28-Jul-2022         Incentive spirometer x10 every hour while awake: Last Dose Taken:  ,  Start Date: 12-May-2022         Compression sleeve and gauntlet full length for left: low pressure 20-30mmhg : Last Dose Taken:  , Low compression: 20-30mmhg                  Lymphedema of arm I89.0, Start Date: 26-Apr-2022         Daily Multiple Vitamins oral tablet: Last Dose Taken:  , Pharmacy please  dispense multivitamin without iron.         1 tab(s) oral once a day, Start Date: 12-Jan-2022         Xarelto 10 mg oral tablet: Last Dose Taken:  , 1 tab orally once a day  , Start Date: 07-Dec-2021         lidocaine-prilocaine 2.5%-2.5% topical cream: Last Dose Taken:  , Apply  topically to affected area one hour prior to mediport use and cover with a nonabsorbant dressing,  prn , Start Date: 14-Jan-2021         azelastine 137 mcg/inh (0.1%) nasal spray: Last Dose Taken:  , 2 spray(s)  nasal 2 times a day         acetaminophen 650 mg oral tablet, extended release: Last Dose Taken:   , 1 tab(s) oral every 8 hours as needed         PARoxetine 20 mg oral tablet: Last Dose Taken:  , 1 tab(s) oral once  a day         hydroxychloroquine 200 mg oral tablet: Last Dose Taken:  , 1 tab(s) orally  once a day         PACLitaxel protein-bound 100 mg intravenous injection: Last Dose Taken:            Zofran 32 mg/50 mL-D5% intravenous solution: Last Dose Taken:           Pepcid 10 mg/mL intravenous solution: Last Dose Taken:           Xgeva: Last  Dose Taken:  , 120 milligram(s) subcutaneous every 3 months         Neulasta Onpro Kit 6 mg/0.6 mL subcutaneous solution: Last Dose Taken:   , 1 kit subcutaneous every 28 days on day 15 of chemotherapy         glucosamine: Last Dose Taken:  , 500mg/400mg oral twice a day.         olopatadine 0.1% ophthalmic solution: Last Dose Taken:  , 1 drop(s) to  each affected eye 2 times a day, As Needed         docusate sodium 100 mg oral capsule: Last Dose Taken:  , 1  orally 2  times a day         Vitamin D3 plus calcium citrate 630-400: Last Dose Taken:  , 2 tab(s)  orally once a day         ProAir HFA 90 mcg/inh inhalation aerosol: Last Dose Taken:  , 2 puff(s)  inhaled every 6 hours, As Needed         amLODIPine 10 mg oral tablet: Last Dose Taken:  , 1 tab(s) orally once  a day         Singulair 10 mg oral tablet: Last Dose Taken:  , 1 tab(s) orally once  a day         benzonatate 100 mg oral capsule: Last Dose Taken:  , 1 cap(s) orally  3 times a day         Tubersol 1 tuberculin units/0.1 mL intradermal solution: Last Dose Taken:            ZyrTEC 10 mg oral tablet: Last Dose Taken:  , 1 tab(s) orally once a  day         Flovent 110 mcg/inh inhalation aerosol with adapter: Last Dose Taken:   , 2 puff(s) inhaled 2 times a day         omeprazole 40 mg oral delayed release capsule: Last Dose Taken:  , 1  cap(s) orally 2 times a day             Medical History:         Submandibular abscess: ICD-10: K12.2, Status: Active         History of deep venous thrombosis: ICD-10: Z86.718, Status:  Active         Anemia due to other cause: ICD-10: D64.89, Status: Active         Back pain: ICD-10: M54.9, Status: Active         Ovarian cancer: ICD-10: C56.9, Status: Active         Pelvic mass: ICD-10: R19.00, Status: Active       Surg History:         History of incision and drainage: ICD-10: Z98.890, Status:  Active         Palliative chemotherapy underway: ICD-10: Z51.5, Status: Active         Social History:   Social Substance  History:  ·  Smoking Status never smoker (1)            Vitals and Measurements:   Vitals: Temp: 36.2  HR: 84  RR: 20  BP: 101/84  SPO2%:   93   Measurements: HT(cm): 144.4  WT(kg): NA  BSA: NA   BMI:  NA      Physical Exam:      Constitutional: Well developed, awake/alert/oriented  x3, no distress, alert and cooperative   Eyes: PERRL, EOMI, clear sclera   ENMT: mucous membranes moist, no apparent injury,  no lesions seen   Head/Neck: Neck supple, no apparent injury, thyroid  without mass or tenderness, No JVD, trachea midline, no bruits   Respiratory/Thorax: Patent airways, CTAB, normal  breath sounds with good chest expansion, thorax symmetric   Cardiovascular: Regular, rate and rhythm, no murmurs,  2+ equal pulses of the extremities, normal S 1and S 2   Gastrointestinal: Nondistended, soft, non-tender,  no rebound tenderness or guarding, no masses palpable, no organomegaly, +BS, no bruits   Genitourinary: No Discharge, vesicles or other abnormalities   Musculoskeletal: ROM intact, no joint swelling, normal  strength   Extremities: normal extremities, no cyanosis edema,  contusions or wounds, no clubbing   Neurological: alert and oriented x3, intact senses,  motor, response and reflexes, normal strength   Breast: No masses, tenderness, no discharge or discoloration   Lymphatic: No significant lymphadenopathy   Psychological: Appropriate mood and behavior   Skin: Warm and dry, no lesions, no rashes         Lab Results:         Radiology Result:     ·  Results           Impression:     1. Two peripherally enhancing hepatic lesions measuring up to 1.9 cm.  Given the patient'shistory of ovarian cancer, these lesions are  highly suspicious for metastatic disease.  2. Partial visualization of a large pelvic mass consistent with the  patient's history of metastatic ovarian cancer and better  characterized on prior cross-sectional imaging.      MRI Liver w/wo Contrast [Jul 8 2020  3:30PM]        Impression:      CHEST:  1.  No focal infiltrate, pulmonary nodule or lymphadenopathy  identified.        ABDOMEN-PELVIS:  1.  Heterogeneous mass in the pelvis, similar to minimally decreased  in size when compared to the prior study.  2. Ill-defined regions of hypodensity within the liver, as described  above. These may represent new areas of fatty infiltration or  metastatic disease. Further evaluation with multiphase contrast MRI  of the liver is recommended.  3. No evidence of bowel obstruction, free intraperitoneal air or  abnormal intra-abdominal fluid collection.      CT Chest Abdomen Pelvis with IV Contrast [Jun 23 2020 10:36AM]        Assessment and Plan:     1. Metastatic ovarian carcinoma, Stage IV with large pelvic mass  Was receiving Abraxane and Avastin maintenance therapy. It was held for her surgery and seems to be doing well since. Her  was 24.2 in 02/2020 and 25.3 in 03/2020.  CEA was 2.1 in 2/2020, 2.9 in 03/2020. Two peripherally enhancing hepatic lesions  measuring up to 1.9 cm. Given the patient's history of ovarian cancer, these lesions are highly suspicious for metastatic disease. I have recommended port placement and resuming therapy with Abraxane and Avastin.       The patient had come for a follow up on 4/13/2023. Physical exam was within normal limits. I reviewed the lab data with the patient. C  She is receiving Abraxane once per week, 3 weeks on, 1 week off, and Avastin every 3 weeks. Effects and side effects  reviewed. Tumor markers have normalized at this time. CT scan obtained on 05/27/2021 revealed bilateral axillary lymphadenopathy likely related to the COVID-19 vaccination. CT scan revealed decrease in size of liver lesion.  Recent CT chest 12/21 did  not reveal any new lesion.  Right subclavian Himacc-v-Bayo skin seems to have ulcerated with possible discharge. She has undergone port removal and port replacement.  The patient has completed dental work and will now initiate Xgeva 120 mg SQ  every 3  months.  The patient reports doing better and is ready to reinstate her chemotherapy regimen using Abraxane once per week, 3 weeks on, 1 week off, and Avastin every 3 weeks.  Restaging CT scan of chest abdomen pelvis in April 2023 revealed findings consistent  with progressive disease in the bones as well as liver.  I had a detailed discussion with the patient explained to her that most likely disease progression is due to missed treatments.  If agreeable would like to resume Abraxane and Avastin and Xgeva.   The patient is agreeable the patient had hernia surgery on March 27, 2023 we will therefore hold Avastin for the cycle continue Abraxane day 1 day 8-day 15 and return in 4 weeks.     The patient and her brother had come for follow-up on May 18, 2023 regarding history of metastatic ovarian carcinoma.  The patient is currently receiving maintenance therapy using Abraxane day 1 day 8-day 15, Avastin every 2 weeks with Neulasta support.   In early May 2023 she was admitted to the hospital with abdominal pain and CT scan revealed free air in the peritoneum.  The patient was managed conservatively and with intravenous antibiotics she was discharged to nursing home where she is receiving  physical therapy.  She has completed the course of antibiotics and has presented for further evaluation and management.  She was brought in a wheelchair with an indwelling Thompson catheter.  I had a detailed discussion with the patient and family explained  to them that given the history of possibility of intestinal perforation we recommend discontinuing Avastin.  Continue Abraxane day 1 day 8-day 15 with Neulasta support.  The patient and family are agreeable.     The patient and family had come for follow-up on June 28, 2023 regarding history of metastatic ovarian carcinoma.  The patient has had stormy course for last 6 months or more.  Patient has had several admissions to the hospital and thus cannot get a complete  cycle  in timely fashion.  A week earlier the patient was admitted to the hospital with UTI received intravenous antibiotics and discharged to nursing home on oral antibiotics which she completed 4 days ago.  She is beginning to feel better.  She has seasonal  allergies and postnasal drip.  We have noted progressive increase in Ca1 25 which were previously normal and CT scans revealing progressive disease.  I explained to them that it would be prudent to discontinue current therapy and consider gemcitabine,  Abraxane based therapy.  Will initiate therapy on June 28, 2023.  Gemcitabine reduced to 750 mg per metered squared, Abraxane reduced to 100 mg per metered squared day 1 day 8, 15 every 28 days.  The patient and family are agreeable.  Return in 1 week.     The patient and family had come for follow-up on 10/19/2023 regarding history of metastatic ovarian carcinoma.  The patient was noted to have progressive disease and was started on chemotherapy using gemcitabine and Abraxane in June 2023.  The patient  then developed MRSA infection in previous laparotomy site at umbilicus.  She was admitted to the hospital received antibiotics and subsequently discharged.  Today, the patient is feeling better and claims to be back at baseline CBC on September 14, 2023  revealed WBC 12.8 hemoglobin 8.9 g/dL platelets 2 97,000/mm³.  The patient to receive cycle 4#1, gemcitabine Abraxane.  Return once a week for 3 weeks 1 week off see me in 4 weeks.       2. Anemia, with iron deficiency component. Resolved     3. Osteomyelitis of the left ramus of the mandible  S/p resection and plutoneum  graft and screws.    Surgical removal of oral hardware.      4. DVT/PE  Continue Xarelto 10 mg.   5. HTN  Continue amlodipine 5 mg.      6.  History of atrial fibrillation     7. Hypercalcemia:  Has received Zometa in the past.  Calcium levels have normalized.    The patient and her brother had come for follow-up on December 28, 2023 regarding history  of metastatic ovarian carcinoma.  The patient was brought in a wheelchair.  She complains of profound weakness and tiredness and bodyaches and arthralgias.  The patient was admitted to the hospital with pneumonia and UTI and discharged to nursing home.  I had a detailed discussion with the patient and her brother and explained to them that her performance status is extremely poor to a level of 3-4 and will have a considerable difficulty in tolerating chemotherapy.  In fact we have observed that since last 18 to 24 months every time the patient gets chemotherapy she is admitted to the hospital for 1 reason or the other with downward trend in her performance status.  I have recommended holding chemotherapy,  consult for palliative care as well as transition to hospice the patient and her brother understood appreciated all the details provided and were grateful.

## 2023-12-28 NOTE — PROGRESS NOTES
Referral was faxed to [facility].  AMILCAR then called [facility] to see about their hospice services.  Spoke with Raji who reports that they are contracted with [facility], [facility], and [facility] are the hospice agencies that are contracted.  He was unsure if other palliative care services can come in.  Spoke with Bacilio.  Discussed that if [facility] is not able, probably better to just go with [facility].  Called [facility] and learned that they can do palliative care, but not hospice there.  AMILCAR went ahead and sent referral to [facility] instead so as to keep better continuity with services.

## 2024-01-01 ENCOUNTER — TELEPHONE (OUTPATIENT)
Dept: HEMATOLOGY/ONCOLOGY | Facility: CLINIC | Age: 74
End: 2024-01-01
Payer: MEDICAID

## 2024-01-02 NOTE — PROGRESS NOTES
Received a call from Bacilio.  He reported that Merna got a call to her phone at the facility.  That it was an automated message to schedule with palliative care.  She was not able to write the number down quick enough or check the caller ID being a landline.  They did not identify the agency either.  JEANCARLOS called [facility] and they reported the fax went to hospice and should be re-faxed to them at [phone].  Unclear where an automated call came from.

## 2024-01-03 NOTE — TELEPHONE ENCOUNTER
JEANCARLOS received a call this morning from [facility] palliative care advising that they cannot take the referral and their leadership has discontinued providing those services in nursing facilities d/t challenges in working effectively alongside facility staff.     AMILCAR sent referral back to [facility] and called Bacilio to provide an update as well.